# Patient Record
Sex: FEMALE | Race: BLACK OR AFRICAN AMERICAN | Employment: UNEMPLOYED | URBAN - METROPOLITAN AREA
[De-identification: names, ages, dates, MRNs, and addresses within clinical notes are randomized per-mention and may not be internally consistent; named-entity substitution may affect disease eponyms.]

---

## 2017-02-15 ENCOUNTER — TELEPHONE (OUTPATIENT)
Dept: SURGERY | Age: 42
End: 2017-02-15

## 2017-04-04 ENCOUNTER — HOSPITAL ENCOUNTER (OUTPATIENT)
Dept: LAB | Age: 42
Discharge: HOME | End: 2017-04-04
Payer: MEDICARE

## 2017-04-04 ENCOUNTER — OFFICE VISIT (OUTPATIENT)
Dept: SURGERY | Age: 42
End: 2017-04-04

## 2017-04-04 VITALS
SYSTOLIC BLOOD PRESSURE: 122 MMHG | TEMPERATURE: 97.1 F | HEART RATE: 64 BPM | DIASTOLIC BLOOD PRESSURE: 77 MMHG | BODY MASS INDEX: 27.15 KG/M2 | HEIGHT: 67 IN | WEIGHT: 173 LBS

## 2017-04-04 DIAGNOSIS — E55.9 HYPOVITAMINOSIS D: ICD-10-CM

## 2017-04-04 DIAGNOSIS — D50.9 IRON DEFICIENCY ANEMIA, UNSPECIFIED IRON DEFICIENCY ANEMIA TYPE: ICD-10-CM

## 2017-04-04 DIAGNOSIS — K91.2 POST-RESECTION MALABSORPTION: Primary | ICD-10-CM

## 2017-04-04 DIAGNOSIS — E51.9 MANIFESTATIONS OF THIAMINE DEFICIENCY: ICD-10-CM

## 2017-04-04 DIAGNOSIS — K91.2 POST-RESECTION MALABSORPTION: ICD-10-CM

## 2017-04-04 LAB
25(OH)D3 SERPL-MCNC: 35.6 NG/ML (ref 30–100)
ALBUMIN SERPL BCP-MCNC: 3.8 G/DL (ref 3.4–5)
ALBUMIN/GLOB SERPL: 1 {RATIO} (ref 0.8–1.7)
ALP SERPL-CCNC: 49 U/L (ref 45–117)
ALT SERPL-CCNC: 15 U/L (ref 13–56)
ANION GAP BLD CALC-SCNC: 9 MMOL/L (ref 3–18)
AST SERPL W P-5'-P-CCNC: 15 U/L (ref 15–37)
BASOPHILS # BLD AUTO: 0 K/UL (ref 0–0.06)
BASOPHILS # BLD: 0 % (ref 0–2)
BILIRUB SERPL-MCNC: 0.5 MG/DL (ref 0.2–1)
BUN SERPL-MCNC: 21 MG/DL (ref 7–18)
BUN/CREAT SERPL: 38 (ref 12–20)
CALCIUM SERPL-MCNC: 8.4 MG/DL (ref 8.5–10.1)
CHLORIDE SERPL-SCNC: 101 MMOL/L (ref 100–108)
CO2 SERPL-SCNC: 27 MMOL/L (ref 21–32)
CREAT SERPL-MCNC: 0.56 MG/DL (ref 0.6–1.3)
DIFFERENTIAL METHOD BLD: ABNORMAL
EOSINOPHIL # BLD: 0.1 K/UL (ref 0–0.4)
EOSINOPHIL NFR BLD: 2 % (ref 0–5)
ERYTHROCYTE [DISTWIDTH] IN BLOOD BY AUTOMATED COUNT: 15.6 % (ref 11.6–14.5)
FERRITIN SERPL-MCNC: 7 NG/ML (ref 8–388)
FOLATE SERPL-MCNC: 18.7 NG/ML (ref 3.1–17.5)
GLOBULIN SER CALC-MCNC: 3.7 G/DL (ref 2–4)
GLUCOSE SERPL-MCNC: 82 MG/DL (ref 74–99)
HCT VFR BLD AUTO: 31.7 % (ref 35–45)
HGB BLD-MCNC: 10 G/DL (ref 12–16)
IRON SERPL-MCNC: 44 UG/DL (ref 50–175)
LYMPHOCYTES # BLD AUTO: 17 % (ref 21–52)
LYMPHOCYTES # BLD: 1 K/UL (ref 0.9–3.6)
MCH RBC QN AUTO: 28.7 PG (ref 24–34)
MCHC RBC AUTO-ENTMCNC: 31.5 G/DL (ref 31–37)
MCV RBC AUTO: 91.1 FL (ref 74–97)
MONOCYTES # BLD: 0.4 K/UL (ref 0.05–1.2)
MONOCYTES NFR BLD AUTO: 7 % (ref 3–10)
NEUTS SEG # BLD: 4.5 K/UL (ref 1.8–8)
NEUTS SEG NFR BLD AUTO: 74 % (ref 40–73)
PLATELET # BLD AUTO: 379 K/UL (ref 135–420)
PMV BLD AUTO: 11.2 FL (ref 9.2–11.8)
POTASSIUM SERPL-SCNC: 4.1 MMOL/L (ref 3.5–5.5)
PROT SERPL-MCNC: 7.5 G/DL (ref 6.4–8.2)
RBC # BLD AUTO: 3.48 M/UL (ref 4.2–5.3)
SODIUM SERPL-SCNC: 137 MMOL/L (ref 136–145)
VIT B12 SERPL-MCNC: 889 PG/ML (ref 211–911)
WBC # BLD AUTO: 6 K/UL (ref 4.6–13.2)

## 2017-04-04 PROCEDURE — 82728 ASSAY OF FERRITIN: CPT | Performed by: PHYSICIAN ASSISTANT

## 2017-04-04 PROCEDURE — 80053 COMPREHEN METABOLIC PANEL: CPT | Performed by: PHYSICIAN ASSISTANT

## 2017-04-04 PROCEDURE — 82306 VITAMIN D 25 HYDROXY: CPT | Performed by: PHYSICIAN ASSISTANT

## 2017-04-04 PROCEDURE — 85025 COMPLETE CBC W/AUTO DIFF WBC: CPT | Performed by: PHYSICIAN ASSISTANT

## 2017-04-04 PROCEDURE — 82607 VITAMIN B-12: CPT | Performed by: PHYSICIAN ASSISTANT

## 2017-04-04 PROCEDURE — 83540 ASSAY OF IRON: CPT | Performed by: PHYSICIAN ASSISTANT

## 2017-04-04 PROCEDURE — 84425 ASSAY OF VITAMIN B-1: CPT | Performed by: PHYSICIAN ASSISTANT

## 2017-04-04 PROCEDURE — 36415 COLL VENOUS BLD VENIPUNCTURE: CPT | Performed by: PHYSICIAN ASSISTANT

## 2017-04-04 RX ORDER — THIAMINE HYDROCHLORIDE 100 MG/ML
200 INJECTION, SOLUTION INTRAMUSCULAR; INTRAVENOUS ONCE
Qty: 1 VIAL | Refills: 0
Start: 2017-04-04 | End: 2017-04-04

## 2017-04-04 NOTE — LETTER
NOTIFICATION OF RETURN TO WORK / SCHOOL 
 
4/4/2017 12:32 PM 
 
Ms. Abhijeet Rowe [facility] [address] [facility] [address] Marely Ross To Whom It May Concern: 
 
Abhijeet Rowe was under the care of [address] [place] [address] on 4/4/2017 She will be able to return to work 4/5/2017 If there are questions or concerns please have the patient contact our office. Sincerely, [address] [facility]

## 2017-04-04 NOTE — PROGRESS NOTES
Patient presents for a bypass follow up of 7/26/16. She is reporting nausea at this time with lightheadedness x3 days. Body mass index is 27.1 kg/(m^2).

## 2017-04-05 PROBLEM — E51.9 MANIFESTATIONS OF THIAMINE DEFICIENCY: Status: ACTIVE | Noted: 2017-04-05

## 2017-04-05 NOTE — PROGRESS NOTES
Pt seen urgently, walked in to office requesting to be seen for sx of nausea, dizziness x past 3 days. She has upcoming appt with AHMET Lamb MD in 2 days and was in area having labs drawn for this appt. She is s/p sleeve gastrectomy on 7/26/16, last seen by GA on 11/17/16. 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 She has developed progressively worsening nausea and now feels dizzy and \"I'm confused too\". She has been eating salmon and eggs and drinking protein shakes. No emesis. She admits to not being diligent with her vitamin/mineral supplements, and \"misses quite a bit\". She denies EtOH use. Past Medical History:   Diagnosis Date    Anemia     Diabetes ([facility] [address] [facility]     Hypertension     Morbid obesity ([facility] [address]     Psychiatric disorder     Anxiety,panic attacks    Sleep apnea      Current Outpatient Prescriptions on File Prior to Visit   Medication Sig Dispense Refill    dextroamphetamine-amphetamine (ADDERALL) 20 mg tablet Take 20 mg by mouth two (2) times a day.  FLUoxetine (PROZAC) 20 mg capsule Take 20 mg by mouth daily.  calcium citrate 200 mg (950 mg) tablet Take 200 mg by mouth daily.  biotin 2,500 mcg tab Take  by mouth daily.  multivitamin (ONE A DAY) tablet Take 1 Tab by mouth daily.  calcium-cholecalciferol, d3, 600-125 mg-unit tab Take  by mouth daily.  cyanocobalamin 1,000 mcg tablet Take 1,000 mcg by mouth daily.  clonazePAM (KLONOPIN) 1 mg tablet Take 1 mg by mouth as needed. 0    ergocalciferol (ERGOCALCIFEROL) 50,000 unit capsule Take 50,000 Units by mouth every seven (7) days. 0    ferrous sulfate 325 mg (65 mg iron) tablet   0     No current facility-administered medications on file prior to visit.       Weight Metrics 4/4/2017 1/17/2017 11/17/2016 8/19/2016 8/5/2016 7/26/2016 7/21/2016   Weight 173 lb 180 lb 202 lb 234 lb 240 lb - 254 lb   BMI 27.1 kg/m2 28.19 kg/m2 31.64 kg/m2 36.65 kg/m2 37.58 kg/m2 39.77 kg/m2 -     Visit Vitals    /77    Pulse 64    Temp 97.1 °F (36.2 °C)    Ht 5' 7\" (1.702 m)    Wt 78.5 kg (173 lb)    BMI 27.1 kg/m2     Appears unwell  Abd: soft/NT    A/P: 8 months s/p sleeve gastrectomy with supplement noncompliance, nausea, mild cognitive changes with possible early dehydration, thiamine deficiency. Pt agreed to IM inj of B1--administered 100mg IM bilat deltoid without complications. Pt noted improvement in nausea and able to drink fluids in office prior to returning home. Encouraged push fluids x next 2-3 days, start all supplements including B1 100mg poqd, and have labs drawn now (anticipate nl-high B1 levels). Pt to keep scheduled f/u visit with AHMET Martinez MD on 4/6/17.  To call sooner if sx persist/worsen  Nataliya Barger PA-C

## 2017-04-06 LAB — VIT B1 BLD-SCNC: 208.7 NMOL/L (ref 66.5–200)

## 2017-04-18 ENCOUNTER — DOCUMENTATION ONLY (OUTPATIENT)
Dept: BARIATRICS/WEIGHT MGMT | Age: 42
End: 2017-04-18

## 2017-04-19 ENCOUNTER — OFFICE VISIT (OUTPATIENT)
Dept: SURGERY | Age: 42
End: 2017-04-19

## 2017-04-19 VITALS
SYSTOLIC BLOOD PRESSURE: 141 MMHG | RESPIRATION RATE: 20 BRPM | DIASTOLIC BLOOD PRESSURE: 87 MMHG | WEIGHT: 174 LBS | BODY MASS INDEX: 27.31 KG/M2 | HEIGHT: 67 IN | TEMPERATURE: 97.9 F | HEART RATE: 85 BPM

## 2017-04-19 DIAGNOSIS — E55.9 HYPOVITAMINOSIS D: ICD-10-CM

## 2017-04-19 DIAGNOSIS — K91.2 POST-RESECTION MALABSORPTION: Primary | ICD-10-CM

## 2017-04-19 DIAGNOSIS — D50.8 IRON DEFICIENCY ANEMIA SECONDARY TO INADEQUATE DIETARY IRON INTAKE: ICD-10-CM

## 2017-04-19 DIAGNOSIS — E51.9 MANIFESTATIONS OF THIAMINE DEFICIENCY: ICD-10-CM

## 2017-04-19 DIAGNOSIS — E66.01 MORBID OBESITY DUE TO EXCESS CALORIES (HCC): ICD-10-CM

## 2017-04-19 NOTE — PROGRESS NOTES
Subjective:      Lenny Hampton is a 42 y.o. female is now 9 months status post laparoscopic sleeve gastrectomy. Doing well overall. Currently on a full diet without difficulty. Taking in 60oz water daily. Sources of protein include chicken, fish and protein drinks. 30 min of activity 3 days a week, including rowing, sit ups, plank. Patient states that she has been suffering from a state of confusion for about two weeks. You received thiamine injections and that cleared it up for about 4-5 days, but is back to feeling the confusion. She started taking multivitamin but it only has 9mg of thiamine. Bowel movements are constipated. The patient is not having any pain. . The patient is compliant with multivitamins, calcium, Vit D and B12 supplements. Weight Loss Metrics 4/19/2017 4/4/2017 1/17/2017 11/17/2016 11/17/2016 8/19/2016 8/19/2016   Pre op / Initial Wt - - - 259 - 259 -   Today's Wt 174 lb 173 lb 180 lb - 202 lb - 234 lb   BMI 27.25 kg/m2 27.1 kg/m2 28.19 kg/m2 - 31.64 kg/m2 - 36.65 kg/m2   Ideal Body Wt - - - 140 - 140 -   Excess Body Wt - - - 119 - 119 -   Goal Wt - - - 164 - 163.8 -   Wt loss to date - - - 57 - 25 -   % Wt Loss - - - 0.6 - 0.26 -   80% EBW - - - 95.2 - 95.2 -       Body mass index is 27.25 kg/(m^2).     Comorbidities:    Hypertension: improved  Diabetes: resolved  Obstructive Sleep Apnea: improved  Hyperlipidemia: not applicable  Stress Urinary Incontinence: worsened  Gastroesophageal Reflux: not applicable  Weight related arthropathy:not applicable        Past Medical History:   Diagnosis Date    Anemia     Diabetes ([address]     Hypertension     Morbid obesity ([address]     Psychiatric disorder     Anxiety,panic attacks    Sleep apnea        Past Surgical History:   Procedure Laterality Date    HX GASTRECTOMY  7/ 2016    Vertical sleeve Gastrectomy    HX GYN      pelvic laparoscopy       Current Outpatient Prescriptions   Medication Sig Dispense Refill    Omega-3 Fatty Acids (FISH OIL) 500 mg cap Take  by mouth.  dextroamphetamine-amphetamine (ADDERALL) 20 mg tablet Take 20 mg by mouth two (2) times a day.  FLUoxetine (PROZAC) 20 mg capsule Take 20 mg by mouth daily.  calcium citrate 200 mg (950 mg) tablet Take 200 mg by mouth daily.  biotin 2,500 mcg tab Take  by mouth daily.  multivitamin (ONE A DAY) tablet Take 1 Tab by mouth daily.  calcium-cholecalciferol, d3, 600-125 mg-unit tab Take  by mouth daily.  cyanocobalamin 1,000 mcg tablet Take 1,000 mcg by mouth daily.  clonazePAM (KLONOPIN) 1 mg tablet Take 1 mg by mouth as needed. 0    ergocalciferol (ERGOCALCIFEROL) 50,000 unit capsule Take 50,000 Units by mouth every seven (7) days. 0    ferrous sulfate 325 mg (65 mg iron) tablet   0       No Known Allergies      Objective:     Visit Vitals    /87 (BP 1 Location: Left arm, BP Patient Position: At rest)    Pulse 85    Temp 97.9 °F (36.6 °C) (Oral)    Resp 20    Ht 5' 7\" (1.702 m)    Wt 78.9 kg (174 lb)    BMI 27.25 kg/m2       General:  alert, cooperative, no distress, appears stated age   Chest: lungs clear to auscultation, no accessory muscle use   Cor:   Regular rate and rhythm   Abdomen: soft, bowel sounds active, non-tender, no hernias   Incisions:   healing well, no drainage, no erythema, no hernia, no seroma, no swelling, no dehiscence, incision well approximated       Labs: reviewed    Assessment:     Doing well postoperatively but has confusion and constipation    Plan:     Increase activity to the goal of 30 minutes daily, Follow up labs as ordered and Follow up with Registered Dietician  Encouraged to attend support group  Miralax titration was explained to patient  Patient to start taking 100mg of thiamine daily along with the other recommended vitamins  Follow up in 3 months unless the confusion persists, then call us to make an appointment sooner.

## 2017-04-19 NOTE — LETTER
NOTIFICATION OF RETURN TO WORK / SCHOOL 
 
4/19/2017 4:24 PM 
 
Ms. Tod Vaughn [facility] [address] [address] Renee Aldana To Whom It May Concern: 
 
Tod Vaughn was under the care of Frank Van 11 She will be able to return to work on 4/20/2017 If there are questions or concerns please have the patient contact our office.  
 
Sincerely, 
 
 
RYAN Moncada

## 2017-04-19 NOTE — PROGRESS NOTES
Lenny Hampton is a 42 y.o. female who presents today with   Chief Complaint   Patient presents with    Morbid Obesity     Lap Vertical sleeve 7/26/2016

## 2017-06-29 ENCOUNTER — DOCUMENTATION ONLY (OUTPATIENT)
Dept: SURGERY | Age: 42
End: 2017-06-29

## 2017-06-29 NOTE — PROGRESS NOTES
Per [facility] requirements;  E-mail and letter sent for follow up appointment. [facility] [address]      Dear Neftaly Corral,  Congratulations!! It has almost been one year since your bariatric surgery and it is time to schedule a follow up appointment with Dr. Jefferson Mercer. Your health is our main concern. It is important for your health to have follow- up lab work and to see you surgeon at 3 months, 6 months and annually after your weight loss surgery. Additionally, the Department of bariatric Surgery at our hospital is a member of the [employer] [address] Surgical Quality Improvement Program ([facility] NSQIP). As a participant in this program, we gather information on the outcomes of our patients after surgery. Please call the office for a follow up appointment at [phone]. If you have moved out of the area or have changed surgeons please call us and let us know the name of your doctor. Your health and feedback are important to us. We greatly appreciate your response.        Thank you,  [facility] [address] [facility]

## 2018-06-29 ENCOUNTER — DOCUMENTATION ONLY (OUTPATIENT)
Dept: SURGERY | Age: 43
End: 2018-06-29

## 2018-06-29 NOTE — PROGRESS NOTES
Per MBSAQIP requirements:  Letter sent requesting follow up appointment. [facility] [place]  [facility]  [facility]      Dear Patient,  Your health is our main concern. It is important for your health to have follow-up lab work and to see you surgeon at 3 months, 6 months and annually after your weight loss surgery. Additionally, the Department of bariatric Surgery at our hospital is a member of the [employer] [address] Surgical Quality Improvement Program ([facility] NSQIP). As a participant in this program, we gather information on the outcomes of our patients after surgery. Please call the office for a follow up appointment at [phone] with SHEELA Harvey. If you have moved out of the area or have changed surgeons please call us and let us know the name of your doctor. Your health and feedback are important to us. We greatly appreciate your response.        Thank you,  [facility] [address] [facility]

## 2019-06-06 ENCOUNTER — DOCUMENTATION ONLY (OUTPATIENT)
Dept: SURGERY | Age: 44
End: 2019-06-06

## 2019-06-06 NOTE — LETTER
[facility] Dear Patient, Your health is our main concern. It is important for your health to have follow-up lab work and to see you surgeon at 3 months, 6 months and annually after your weight loss surgery. Additionally, the Department of Bariatric Surgery at our hospital is a member of the [employer] [address] Surgical Quality Improvement Program ([facility] NSQIP). As a participant in this program, we gather information on the outcomes of our patients after surgery. Please call the office for a follow up appointment at [phone]. If you have moved out of the area or have changed surgeons please call us and let us know the name of your doctor. Your health and feedback are important to us. We greatly appreciate your response. Thank you, [facility]

## 2019-07-17 ENCOUNTER — TELEPHONE (OUTPATIENT)
Dept: SURGERY | Age: 44
End: 2019-07-17

## 2019-07-17 DIAGNOSIS — K91.2 POST-RESECTION MALABSORPTION: Primary | ICD-10-CM

## 2019-07-17 DIAGNOSIS — E66.01 MORBID OBESITY (HCC): ICD-10-CM

## 2019-08-07 ENCOUNTER — HOSPITAL ENCOUNTER (OUTPATIENT)
Dept: LAB | Age: 44
Discharge: HOME | End: 2019-08-07
Payer: MEDICAID

## 2019-08-07 DIAGNOSIS — K91.2 POST-RESECTION MALABSORPTION: ICD-10-CM

## 2019-08-07 DIAGNOSIS — E66.01 MORBID OBESITY (HCC): ICD-10-CM

## 2019-08-07 LAB
ALBUMIN SERPL-MCNC: 4.1 G/DL (ref 3.4–5)
ANION GAP SERPL CALC-SCNC: 5 MMOL/L (ref 3–18)
BASOPHILS # BLD: 0 K/UL (ref 0–0.1)
BASOPHILS NFR BLD: 0 % (ref 0–2)
BUN SERPL-MCNC: 17 MG/DL (ref 7–18)
BUN/CREAT SERPL: 22 (ref 12–20)
CALCIUM SERPL-MCNC: 9.1 MG/DL (ref 8.5–10.1)
CHLORIDE SERPL-SCNC: 102 MMOL/L (ref 100–111)
CO2 SERPL-SCNC: 30 MMOL/L (ref 21–32)
CREAT SERPL-MCNC: 0.77 MG/DL (ref 0.6–1.3)
DIFFERENTIAL METHOD BLD: ABNORMAL
EOSINOPHIL # BLD: 0 K/UL (ref 0–0.4)
EOSINOPHIL NFR BLD: 0 % (ref 0–5)
ERYTHROCYTE [DISTWIDTH] IN BLOOD BY AUTOMATED COUNT: 20.4 % (ref 11.6–14.5)
GLUCOSE SERPL-MCNC: 90 MG/DL (ref 74–99)
HCT VFR BLD AUTO: 26.6 % (ref 35–45)
HGB BLD-MCNC: 7.1 G/DL (ref 12–16)
LYMPHOCYTES # BLD: 1.4 K/UL (ref 0.9–3.6)
LYMPHOCYTES NFR BLD: 27 % (ref 21–52)
MCH RBC QN AUTO: 19 PG (ref 24–34)
MCHC RBC AUTO-ENTMCNC: 26.7 G/DL (ref 31–37)
MCV RBC AUTO: 71.3 FL (ref 74–97)
MONOCYTES # BLD: 0.2 K/UL (ref 0.05–1.2)
MONOCYTES NFR BLD: 5 % (ref 3–10)
NEUTS SEG # BLD: 3.4 K/UL (ref 1.8–8)
NEUTS SEG NFR BLD: 68 % (ref 40–73)
PLATELET # BLD AUTO: 406 K/UL (ref 135–420)
PMV BLD AUTO: 10.1 FL (ref 9.2–11.8)
POTASSIUM SERPL-SCNC: 4.3 MMOL/L (ref 3.5–5.5)
RBC # BLD AUTO: 3.73 M/UL (ref 4.2–5.3)
SODIUM SERPL-SCNC: 137 MMOL/L (ref 136–145)
WBC # BLD AUTO: 5 K/UL (ref 4.6–13.2)

## 2019-08-07 PROCEDURE — 84425 ASSAY OF VITAMIN B-1: CPT

## 2019-08-07 PROCEDURE — 36415 COLL VENOUS BLD VENIPUNCTURE: CPT

## 2019-08-07 PROCEDURE — 82306 VITAMIN D 25 HYDROXY: CPT

## 2019-08-07 PROCEDURE — 80048 BASIC METABOLIC PNL TOTAL CA: CPT

## 2019-08-07 PROCEDURE — 82607 VITAMIN B-12: CPT

## 2019-08-07 PROCEDURE — 83540 ASSAY OF IRON: CPT

## 2019-08-07 PROCEDURE — 82040 ASSAY OF SERUM ALBUMIN: CPT

## 2019-08-07 PROCEDURE — 82728 ASSAY OF FERRITIN: CPT

## 2019-08-07 PROCEDURE — 85025 COMPLETE CBC W/AUTO DIFF WBC: CPT

## 2019-08-08 LAB
25(OH)D3 SERPL-MCNC: 18.7 NG/ML (ref 30–100)
FERRITIN SERPL-MCNC: 2 NG/ML (ref 8–388)
FOLATE SERPL-MCNC: 19.2 NG/ML (ref 3.1–17.5)
IRON SERPL-MCNC: 22 UG/DL (ref 50–175)
VIT B12 SERPL-MCNC: 435 PG/ML (ref 211–911)

## 2019-08-09 ENCOUNTER — HOSPITAL ENCOUNTER (OUTPATIENT)
Dept: INFUSION THERAPY | Age: 44
Discharge: HOME | End: 2019-08-09
Payer: MEDICAID

## 2019-08-09 ENCOUNTER — OFFICE VISIT (OUTPATIENT)
Dept: SURGERY | Age: 44
End: 2019-08-09

## 2019-08-09 ENCOUNTER — DOCUMENTATION ONLY (OUTPATIENT)
Dept: SURGERY | Age: 44
End: 2019-08-09

## 2019-08-09 ENCOUNTER — TELEPHONE (OUTPATIENT)
Dept: SURGERY | Age: 44
End: 2019-08-09

## 2019-08-09 VITALS
OXYGEN SATURATION: 100 % | HEART RATE: 60 BPM | SYSTOLIC BLOOD PRESSURE: 145 MMHG | RESPIRATION RATE: 17 BRPM | TEMPERATURE: 97.2 F | DIASTOLIC BLOOD PRESSURE: 84 MMHG

## 2019-08-09 VITALS
BODY MASS INDEX: 29.82 KG/M2 | OXYGEN SATURATION: 98 % | WEIGHT: 190 LBS | SYSTOLIC BLOOD PRESSURE: 128 MMHG | HEIGHT: 67 IN | HEART RATE: 81 BPM | DIASTOLIC BLOOD PRESSURE: 81 MMHG | TEMPERATURE: 96.6 F

## 2019-08-09 DIAGNOSIS — Z98.84 HISTORY OF GASTRIC BYPASS: ICD-10-CM

## 2019-08-09 DIAGNOSIS — E66.01 MORBID OBESITY (HCC): Primary | ICD-10-CM

## 2019-08-09 DIAGNOSIS — E55.9 HYPOVITAMINOSIS D: ICD-10-CM

## 2019-08-09 DIAGNOSIS — E53.8 FOLATE DEFICIENCY: ICD-10-CM

## 2019-08-09 DIAGNOSIS — D50.9 IRON DEFICIENCY ANEMIA, UNSPECIFIED IRON DEFICIENCY ANEMIA TYPE: ICD-10-CM

## 2019-08-09 DIAGNOSIS — K91.2 POST-RESECTION MALABSORPTION: ICD-10-CM

## 2019-08-09 DIAGNOSIS — D50.9 IRON DEFICIENCY ANEMIA, UNSPECIFIED IRON DEFICIENCY ANEMIA TYPE: Primary | ICD-10-CM

## 2019-08-09 DIAGNOSIS — Z90.3 HISTORY OF SLEEVE GASTRECTOMY: ICD-10-CM

## 2019-08-09 DIAGNOSIS — E51.9 MANIFESTATIONS OF THIAMINE DEFICIENCY: ICD-10-CM

## 2019-08-09 DIAGNOSIS — K91.2 POSTOPERATIVE MALABSORPTION: ICD-10-CM

## 2019-08-09 LAB — VIT B1 BLD-SCNC: 124.2 NMOL/L (ref 66.5–200)

## 2019-08-09 PROCEDURE — 74011250636 HC RX REV CODE- 250/636: Performed by: PHYSICIAN ASSISTANT

## 2019-08-09 PROCEDURE — 96365 THER/PROPH/DIAG IV INF INIT: CPT

## 2019-08-09 RX ORDER — DEXTROAMPHETAMINE SACCHARATE, AMPHETAMINE ASPARTATE, DEXTROAMPHETAMINE SULFATE AND AMPHETAMINE SULFATE 7.5; 7.5; 7.5; 7.5 MG/1; MG/1; MG/1; MG/1
TABLET ORAL
Refills: 0 | COMMUNITY
Start: 2019-08-06

## 2019-08-09 RX ORDER — SODIUM CHLORIDE 9 MG/ML
250 INJECTION, SOLUTION INTRAVENOUS ONCE
Status: COMPLETED | OUTPATIENT
Start: 2019-08-09 | End: 2019-08-09

## 2019-08-09 RX ADMIN — FERRIC CARBOXYMALTOSE INJECTION 750 MG: 50 INJECTION, SOLUTION INTRAVENOUS at 13:35

## 2019-08-09 RX ADMIN — SODIUM CHLORIDE 250 ML: 900 INJECTION, SOLUTION INTRAVENOUS at 13:35

## 2019-08-09 NOTE — PATIENT INSTRUCTIONS
If you have any questions or concerns about today's appointment, the verbal and/or written instructions you were given for follow up care, please call our office at [phone]. [facility] - [address], [address] 
 
[phone] office [phone] Supplement Resource Guide Protein Supplement (Recommended up to 6 months post-op) ? 60-70 Grams of Protein  (during clear liquid phase) ? 30-50 Grams of Protein  (during soft protein phase and beyond) ? 0-3 g fat per serving/ 0-3 g sugar per serving ? Avoid mixing with milk, fruit, peanut butter, etc.  
(Powder should be made with water or Crystal Light) Calcium Supplement (Lifetime) Look for: Calcium Citrate (1500 mg per day) The body cannot absorb more than 500-600 mg at once and needs to be taken in 3 separate dosages. Recommend: 
? Citracal- 630mg (2 caplets) three times each day ? Upcal D- 3 packages or scoops/day. (Each package taken separately) o wwwTestQuest (powdered calcium) ? Liquid- 3 Tbsp. per day. (Each Tbsp. taken separately) ? Chewable- Must be Calcium citrate 
o www.[employer] 
o www.[employer] Vitamin D (Lifetime)                           Look for: Vitamin D3 (Cholecalciferol) ? 10,000 IU of Vitamin D3 per day ? This is in ADDITION to the Vitamin D in your Calcium and Multivitamin Multi-Vitamin Supplement (Lifetime) Take 2 vitamins separately each day ? Flintstones Complete or a generic chewable complete multivitamin ? Bariatric Advantage Multivitamin Vitamin B1 (Lifetime) ? 100 mg B1 needed per day (if taking Flintstones Complete or a generic complete chewable). ? Additional B1 is NOT needed if taking Bariatric Advantage Multivitamin (Bariatric Advantage has adequate B1 in it). Vitamin B12 (Lifetime) 1000 mcg DAILY of Vitamin B12 
? All Vitamin B12 must be taken sublingually (under your tongue) Iron *Required for menstruating women OR all patients that have a history of low iron* 
? Recommended to take 500 mg of Vitamin C chewable 30 minutes prior to taking the iron to help with absorption ? Avoid taking with calcium supplements. (Calcium inhibits the absorption of iron) ? We recommend going to Bariatric Advantages Website and getting their iron. (www.bariatricadvantage. exozet) (The lemon-lime has 60mg of iron) ? OTC iron is a dosage of 65 mg Local Psychologists/Counselors Nadine Vyas Psy.D. Licensed Clinical Psychologist 
[address] [facility] [address] [address]., [address] [place], [address] Phone: [phone] Fax: [phone] Dr. Gracie Rodriguez [phone] 
[email or web address] (TMG)[facility] Hospitalcounseling. exozet/ 
 
 
Piero Dillon 
www.[facility] CenterunsBluefield Regional Medical CenterGoNabiter. com Latha Ruelas [phone] Dr. Krupa Ruiz 
[facility].exozet.Qwiki/ 
 
 
Jannie Yee 
http://[employer]/ 
 
 
Teresa Search 
[employer].exozet.[employer]. com/dgyww-k-awlvrt-licensed-clinical-social-worker-board-certified-diplomate-in-clinical-social-work.html Willian Lockwood [phone] [facility] [address] [facility], [address] [place], [place].  [phone] [facility] [address] [facility] , Willian Ledbetter, phone [phone] [facility] [address] [facility] , [address] [place] [address]. Phone: [phone] [facility] [address] [facility], [address] [place] [address]. Phone: [phone] [facility] psychiatric group 
[address] [facility]. [address], [place], [place] phone: [phone] [facility] [address] [place] [address]. Phone: [phone] [facility] psych testing, Katie Lizama, PhD 
[address] [place] [address] [address] [facility] [place] [address]. Phone: [phone] [facility] psychological resource [address] [place], [place]. Phone: [phone] Juana Lindo, WAYNE 
[address] [employer], [place], [place]. Phone: [phone] [facility] center [address] [place] [address]. Phone: [phone] The psychotherapy center [address]. Phone: [phone] [facility]  phone: [phone] [facility] psychiatric phone: [phone] [facility] behavioral health phone: [phone]

## 2019-08-09 NOTE — TELEPHONE ENCOUNTER
Notified Brianna Albrecht who stated that the patient needs to come in today as her lab results are abnormal. Requested that I call the patient back and let her know that she really needs to be seen. Patient was called and notified what Brianna Albrecht had stated and patient said \"okay, thank you. I need to call my dad and I will call you all later\". Verbal understanding given to patient. Closing encounter.

## 2019-08-09 NOTE — PROGRESS NOTES
Bariatric Follow Up Note    Isaura Cintron is a 39 y.o. female is now about 3 years status post laparoscopic vertical sleeve gastrectomy lost to follow-up since being seen in February 2017. Struggling a bit. Currently on a regular diet without difficulty. Financial difficulty so pt has not followed up. Taking in 10-20oz fluid,  >30g protein. Admits to not exercising. The patient admits hair loss. Bowel movements are regular. The patient is not having any pain. She  Is not compliant with multivitamins, Protein, calcium, Vit D and B12 supplements. She is not compliant with medications and taking psych meds incorrectly. Has not f/u'd with psych counseling. Pt is tearful and distraught. States barely enough money for food. Concerned re: her health for her children. The patient reports no difficulty eating/drinking. She states she eats and drinks whatever she wants and admits that most of it is \"junk food. \" Living on fast food, soda and fries. The patient denies smoking, etOH use, admits NSAID use and admits to carbonation ingestion. Weight Loss Metrics 8/9/2019 8/9/2019 12/17/2018 4/19/2017 4/4/2017 1/17/2017 11/17/2016   Pre op / Initial Wt 259 - - - - - 259   Today's Wt - 190 lb 189 lb 174 lb 173 lb 180 lb -   BMI - 29.76 kg/m2 29.6 kg/m2 27.25 kg/m2 27.1 kg/m2 28.19 kg/m2 -   Ideal Body Wt 140 - - - - - 140   Excess Body Wt 119 - - - - - 119   Goal Wt 164 - - - - - 164   Wt loss to date 69 - - - - - 57   % Wt Loss 0.72 - - - - - 0.6   80% EBW 95.2 - - - - - 95.2       Body mass index is 29.76 kg/m².       Comorbidities:     Hypertension: improved  Diabetes: resolved  Obstructive Sleep Apnea: improved  Hyperlipidemia: not applicable  Stress Urinary Incontinence: not applicable  Gastroesophageal Reflux: not applicable  Weight related arthropathy:not applicable     Past Medical History:   Diagnosis Date    Anemia     [facility] ([facility]     Hypertension     [facility]     Psychiatric disorder Anxiety,panic attacks    Sleep apnea        Past Surgical History:   Procedure Laterality Date    HX GASTRECTOMY  7/ 2016    Vertical sleeve Gastrectomy    HX GYN      pelvic laparoscopy       Current Outpatient Medications   Medication Sig Dispense Refill    dextroamphetamine-amphetamine (ADDERALL) 30 mg tablet take 1 tablet by mouth twice a day  0    dextroamphetamine-amphetamine (ADDERALL) 20 mg tablet Take 30 mg by mouth two (2) times a day.  FLUoxetine (PROZAC) 20 mg capsule Take 20 mg by mouth daily.  clonazePAM (KLONOPIN) 1 mg tablet Take 1 mg by mouth as needed. 0    ergocalciferol (ERGOCALCIFEROL) 50,000 unit capsule Take 50,000 Units by mouth every seven (7) days. 0     Facility-Administered Medications Ordered in Other Visits   Medication Dose Route Frequency Provider Last Rate Last Dose    0.9% sodium chloride infusion 250 mL  250 mL IntraVENous ONCE Jese Thomas PA-C        ferric carboxymaltose (INJECTAFER) injection solution 750 mg  750 mg IntraVENous ONCE Jese Daniels PA-C           No Known Allergies    ROS:  Review of Systems:  Positives in bold    Constitutional:Unexpected weight gain/loss, night sweats,fatigue/maliase/lethargy, change in sleep, fever, rash, Hair Loss  Eyes:  Visual changes, headaches, eye pain, floaters  ENT:  Rhinorrhea, epistaxis, change in hearing, gingival ulcers, bleeding, sore throat, dysphagia  CV:  Denies chest pain, shortness of breath, difficulty breathing, orthopnea, palpitations, loss of consciousness, claudication  Resp: Cough, wheeze, haemoptysis, sob, exercise intolerence  GI:  Abdominal pain, dysphagia, reflux, bloating, cramping. Obstipation, haematemesis, brbpr, hematochezia,dark tarry stools. Nausea, vomitting, diarrhea, constipation.     Neuro: Paresthesias, numbness, weakness, changes in balance, changes in speech, loss of control of bowel or bladder,   Psych: Changes in depression, anxiety, sleep patterns, change in energy Levels    Remainder of ROS as per HPI. Physicial Exam:  Visit Vitals  /81 (BP Patient Position: Sitting)   Pulse 81   Temp 96.6 °F (35.9 °C) (Oral)   Ht 5' 7\" (1.702 m)   Wt 86.2 kg (190 lb)   SpO2 98%   BMI 29.76 kg/m²         General: AAOX3, pleasant and cooperative to exam. Appropriately groomed. NAD. Non-toxic in appearance. Appears stated age. HENT: NC/AT. PERRLA. Extraocular motions are intact. Sclera anicteric, Conjunctiva Clear. Nares clear. Oropharynx pink, moist without exudate or erythema. Uvula Midline. Neck:  Supple, trachea is midline. No JVD, Lymphadenopathy. No bruits. Chest: Good equal bilateral expansion  Lungs: Clear to auscultation bilaterally without e/o crackles, wheezes or rhales. Heart: RRR, S1 and S2 noted. No c/r/m/g/vpmi. Abdomen: obese, soft and non-tender without distension. Good bowel sounds. No vis/palp masses or pulsations. No organo-splenomegaly. No hernias to my exam. No e/o acute abdomen or peritoneal signs. Previous surgical wounds well-healed. Pelvis: Stable. :  Deferred  Rectal: Deferred  Extremities: Positive pulses in all 4 extremities. Baseline range of motion in all 4 extremities. Strength, sensation and reflexes intact, appropriate and equal in b/l upper and lower extremities. No C/C/E  Neuro: CN II-XII grossly intact without focal deficit. Ambulatory. Skin: Clean, warm and dry. Labs: Significant for an impressive iron deficiency anemia. Folate and ferritin deficiency. Hypovitaminosis D. Reviewed with patient. Provided her with the most recent recommendations for vitamin supplementation. I have referred her to hematology for further evaluation regarding possible iron/blood infusions/transfusion. Assessment/Plan: Pt is currently about 3 years s/p laparoscopic sleeve gastrectomy with a total weight loss of 69 lbs to date, struggling a bit, and is grossly malnourished.     Stressed importance of hydration with SF clear liquids until urine clear. Stop NSAIDs, Stop Carbs. Stop Fast Foods. Stop soda and carbonation. OK to resume bariatric diet, with food content mainly meats/veggies. Encouraged support group attendance, recommended dietician visit with food diary. Advised exercise program of 20-30 minutes daily 5-7 times per week. Recommended utilizing bariatric multivitamins or at least adult chewable multivitamins in lieu of flintstones and/or flintstones gummies. Arranged for first Fe infusion for today to be followed by second and appointment with Dr. Glendy Buckner. Recommended f/u psych asap and provided her with a list of providers. Given paresthesias, pt will be given thiamine injection in office today. The patient I had a omi discussion with regards to the importance of regular follow-up. I reminded her that she signed a contract stating she would do so. I also explained to her that she could suffer many vitamin and nutritional abnormalities that could be permanent and irreversible. Failure to follow-up could also result in death. The patient voices an understanding. Follow up 3 months with labs, sooner as needed. Health Maintenance issues reviewed and except as it relates to bariatrics, deferred to primary care. Greater than 50% of this 45 minute visit was spent couseling the patient about the aforementioned issues.          Jose Manuel Daniels MS, PARedC

## 2019-08-09 NOTE — LETTER
NOTIFICATION OF RETURN TO WORK / SCHOOL 
 
8/9/2019 2:43 PM 
 
Ms. Nemiah Sandifer [facility] [id number] [facility] [id number] Erik Arteaga To Whom It May Concern: 
 
Nemiah Sandifer was under the care of [address] [place] [address]. She will be able to return to work/school on 08/10/2019 with no restrictions. If there are questions or concerns please have the patient contact our office.  
 
Sincerely, 
 
 
Ming Reyez PA-C

## 2019-08-09 NOTE — PROGRESS NOTES
Nemiah Sandifer is a 39 y.o. female (: 1975) presenting to address:    Chief Complaint   Patient presents with    Surgical Follow-up     Bariatric annual 16, sleeve gastrectomy        Medication list and allergies have been reviewed with Nemiah Sandifer and updated as of today's date. I have gone over all Medical, Surgical and Social History with Nemiah Sandifer and updated/added the information accordingly. 1. Have you been to the ER, urgent care clinic since your last visit? Hospitalized since your last visit? No    2. Have you seen or consulted any other health care providers outside of the [address] [place] [address] since your last visit? Include any pap smears or colon screening. No      Visit Vitals  /81 (BP Patient Position: Sitting)   Pulse 81   Temp 96.6 °F (35.9 °C) (Oral)   Ht 5' 7\" (1.702 m)   Wt 86.2 kg (190 lb)   SpO2 98%   BMI 29.76 kg/m²       Current Outpatient Medications:     dextroamphetamine-amphetamine (ADDERALL) 30 mg tablet, take 1 tablet by mouth twice a day, Disp: , Rfl: 0    dextroamphetamine-amphetamine (ADDERALL) 20 mg tablet, Take 30 mg by mouth two (2) times a day.     , Disp: , Rfl:     FLUoxetine (PROZAC) 20 mg capsule, Take 20 mg by mouth daily. , Disp: , Rfl:     clonazePAM (KLONOPIN) 1 mg tablet, Take 1 mg by mouth as needed. , Disp: , Rfl: 0    ferrous sulfate 325 mg (65 mg iron) tablet, , Disp: , Rfl: 0    methocarbamol (ROBAXIN-750) 750 mg tablet, Take 1 Tab by mouth four (4) times daily. , Disp: 15 Tab, Rfl: 0    Omega-3 Fatty Acids (FISH OIL) 500 mg cap, Take  by mouth., Disp: , Rfl:     calcium citrate 200 mg (950 mg) tablet, Take 200 mg by mouth daily. , Disp: , Rfl:     multivitamin (ONE A DAY) tablet, Take 1 Tab by mouth daily. , Disp: , Rfl:     calcium-cholecalciferol, d3, 600-125 mg-unit tab, Take  by mouth daily. , Disp: , Rfl:     cyanocobalamin 1,000 mcg tablet, Take 1,000 mcg by mouth daily. , Disp: , Rfl:    ergocalciferol (ERGOCALCIFEROL) 50,000 unit capsule, Take 50,000 Units by mouth every seven (7) days. , Disp: , Rfl: 0    Pre op weight: 259  EBW: 119  Wt loss to date: 71       Patient reports that due to financial reasons she has been eating non approved food items such as fast food dollar menu items, rice, beans, eggs, and a premier protein mean replacement. She informs me that she has not been compliant with her recommended vitamin regimen and fluid intake as she has not been taking her psychotropic medications as prescribed because she cannot afford them. She does report having parasthenia bilaterally in both her upper and lower extremity that is persistent and increasing in severity. She reports dis-satisfaction with her current psych help and is very tearful during the visit. Patient required a lot of encouragement and emotional support during her visit. Patient did have a positive PHQ and was supplied with a reference for psych help. Patient was assisted in making appointments for today for an iron infusion and also a one-on-one with Hollis Harrison RD for next week for nutrition counseling. After the visit patient was walked to [facility] and scheduled to establish care with MINA Monge DNP-BC for 08/21/19. Patient was supplied with written resources for nutrition and vitamin supplements as well appointment reminders and bio cards for her providers and education reinforcement of education given. After she was able to establish care, patient was walked down to [facility] to receive her iron infusion. Patient was also given 200 mg Thiamine injection to bilateral deltoids, patient tolerated well.

## 2019-08-09 NOTE — TELEPHONE ENCOUNTER
Patient called and canceled her appointment today but requested to speak with a Nurse. Call was transferred to me and patient stated that she just wanted to get her lab results. I notified patient that Hernesto Cosby had made a note stating that he would need to discuss with her in person. Patient stated that she was currently on antibiotics that were given to her by the ED and that she is not feeling well and does not want to leave her house. I notified patient that I would have to speak with Hernesto Millery and let him know that she called and that we would give her a return call. Patient stated \"Well, actually I can just come in for my appointment then\". I notified patient that it looked like they had already took her off the scheduled and that I would just have to add her back on at her original appointment time. Patient became very flustered and stated \"Well I am going to have to go and look at my schedule and then i'll call you back\". I notified patient that I would still notify Hernesto Millery that she had called in for results. Verbal understanding given.

## 2019-08-09 NOTE — PROGRESS NOTES
Patient was scheduled to see me today. She has multiple abnormal lab values which I planned to review with her. Most importantly she is suffering from severe iron deficiency anemia which will likely need an iron transfusion possibly blood transfusion. The patient ultimately canceled her appointment today because she \"does not feel well. \"  The patient does have a history of noncompliance in the past.  I have asked Lulu Werner LPN, to contact the patient to have her reschedule to see me as soon as possible. I plan to review all of her labs in person. Lulu Werner was kind enough to stressed to the patient the importance of following up and that some vitamin and mineral deficiencies can be permanent and irreversible if not treated promptly.   The patient voiced an understanding    Blair Flores, MS, PA-C

## 2019-08-09 NOTE — PROGRESS NOTES
[facility] Progress Note    Date: 2019    Name: Woody Barrett    MRN: [id number]         : 1975    Injectafer Infusion 1 of 2    Ms. Augustin Chavez to [facility], ambulatory, at 1320. Pt was assessed and education was provided. Ms. Jessica Nicholson vitals were reviewed and patient was observed for 5 minutes prior to treatment. Visit Vitals  /84 (BP 1 Location: Left arm, BP Patient Position: Sitting)   Pulse 60   Temp 97.2 °F (36.2 °C)   Resp 17   SpO2 100%   Breastfeeding? No         22g PIV placed in left antecubital x1 attempt. PIV flushed easily and had brisk blood return. Injectafer 750mg in 250mL NS infused over 20 minutes as ordered. Ms. Augustin Chavez tolerated the infusion, and had no complaints. VS remained stable. PIV flushed with NS 10 ml and removed. No bleeding or hematoma noted at site. Guaze and coban applied. Ms. Augustin Chavez was observed for 30 minutes post-infusion. No S/S of adverse reactions at this time. Reviewed discharge instructions with patient, including expected side effects and signs of allergic reaction requiring medical attention (itching/hives/rashes, SOB, chest pain, lip/tongue/facial swelling). Patient given printed copy to take home. Patient verbalized understanding of discharge instructions. Injectafer [employer], signed and scanned into patient chart. Patient armband removed and shredded. Ms. Augustin Chavez was discharged from [place] [address] in stable condition at 1435.  She is to return on 19 at 1100 for Injectafer infusion 2 of 2.    Jeremiah Littlejohn RN  2019  1438

## 2019-08-20 ENCOUNTER — APPOINTMENT (OUTPATIENT)
Dept: INFUSION THERAPY | Age: 44
End: 2019-08-20
Payer: MEDICAID

## 2019-08-21 ENCOUNTER — HOSPITAL ENCOUNTER (OUTPATIENT)
Dept: INFUSION THERAPY | Age: 44
Discharge: HOME | End: 2019-08-21
Payer: MEDICAID

## 2019-08-21 ENCOUNTER — OFFICE VISIT (OUTPATIENT)
Dept: FAMILY MEDICINE CLINIC | Age: 44
End: 2019-08-21

## 2019-08-21 VITALS
SYSTOLIC BLOOD PRESSURE: 110 MMHG | HEART RATE: 62 BPM | OXYGEN SATURATION: 100 % | RESPIRATION RATE: 18 BRPM | TEMPERATURE: 97.2 F | DIASTOLIC BLOOD PRESSURE: 71 MMHG

## 2019-08-21 VITALS
SYSTOLIC BLOOD PRESSURE: 131 MMHG | HEART RATE: 59 BPM | DIASTOLIC BLOOD PRESSURE: 72 MMHG | TEMPERATURE: 97.4 F | RESPIRATION RATE: 18 BRPM | BODY MASS INDEX: 30.23 KG/M2 | HEIGHT: 67 IN | WEIGHT: 192.6 LBS

## 2019-08-21 DIAGNOSIS — D50.8 OTHER IRON DEFICIENCY ANEMIA: Primary | ICD-10-CM

## 2019-08-21 DIAGNOSIS — R21 RASH: ICD-10-CM

## 2019-08-21 DIAGNOSIS — L85.3 DRY SKIN: ICD-10-CM

## 2019-08-21 DIAGNOSIS — E66.01 MORBID OBESITY (HCC): ICD-10-CM

## 2019-08-21 DIAGNOSIS — E11.9 CONTROLLED TYPE 2 DIABETES MELLITUS WITHOUT COMPLICATION, WITHOUT LONG-TERM CURRENT USE OF INSULIN (HCC): ICD-10-CM

## 2019-08-21 PROCEDURE — 74011250636 HC RX REV CODE- 250/636: Performed by: PHYSICIAN ASSISTANT

## 2019-08-21 PROCEDURE — 96365 THER/PROPH/DIAG IV INF INIT: CPT

## 2019-08-21 PROCEDURE — 74011250636 HC RX REV CODE- 250/636: Performed by: INTERNAL MEDICINE

## 2019-08-21 RX ORDER — SODIUM CHLORIDE 9 MG/ML
250 INJECTION, SOLUTION INTRAVENOUS ONCE
Status: COMPLETED | OUTPATIENT
Start: 2019-08-21 | End: 2019-08-21

## 2019-08-21 RX ORDER — BISMUTH SUBSALICYLATE 262 MG
1 TABLET,CHEWABLE ORAL DAILY
COMMUNITY

## 2019-08-21 RX ADMIN — FERRIC CARBOXYMALTOSE INJECTION 750 MG: 50 INJECTION, SOLUTION INTRAVENOUS at 11:31

## 2019-08-21 RX ADMIN — SODIUM CHLORIDE 250 ML: 9 INJECTION, SOLUTION INTRAVENOUS at 11:31

## 2019-08-21 NOTE — PROGRESS NOTES
[facility] Progress Note    Date: 2019    Name: Joseph Vincent    MRN: [id number]         : 1975    Injectafer Infusion 2 of 2    Ms. Kelley Mojica to [facility], ambulatory, at [phone]. Pt was assessed and education was provided. Ms. Cleveland List vitals were reviewed and patient was observed for 5 minutes prior to treatment. Visit Vitals  /71 (BP 1 Location: Left arm, BP Patient Position: Sitting)   Pulse 62   Temp 97.2 °F (36.2 °C)   Resp 18   SpO2 100%         22g PIV placed in left antecubital x1 attempt. PIV flushed easily and had brisk blood return. Injectafer 750mg in 250mL NS infused over 20 minutes as ordered. Ms. Kelley Mojica tolerated the infusion, and had no complaints. VS remained stable. PIV flushed with NS 10 ml and removed. No bleeding or hematoma noted at site. Guaze and coban applied. Ms. Kelley Mojica was observed for 30 minutes post-infusion. No S/S of adverse reactions at this time. Patient armband removed and shredded. Ms. Kelley Mojica was discharged from [place] [address] in stable condition at 1155. She is to follow-up with referring provider.     Bharath Tamez RN  2019  1155

## 2019-08-21 NOTE — PATIENT INSTRUCTIONS
Healthy Lifestyle Choices    The choices you make every day determine whether you are likely to stay healthy or be at risk of developing or worsening chronic diseases like diabetes and high blood pressure. Weight: Your Body mass index is 30.17 kg/m². Body mass index or BMI is only an estimate and does not include factors such as your muscle mass. In general it is healthy to have a BMI between 18.5 and 25. Nutrition: I recommend a diet high in vegetables, low in carbs and sugars, and low in processed foods. The Mediterranean diet is a good guideline for most people even if you are already at a healthy weight. If you would like more guidance, schedule an appointment with me to discuss your nutrition. Hydration: Drink about 8-10 cups of water daily. Avoid soda and other sweetened beverages. Exercise: Aim for at least 30 minutes of physical activity 5 days a week. If you have been sedentary for a while, start with light exercise and gradually increase your activity. Exercise should be something you enjoy and can easily incorporate into your daily routine. Examples: a walk at lunchtime, playing outdoors with your kids, swimming, dancing, yoga, etc.     Sleep: Most people need 7-8 hours of uninterrupted sleep. Talk to me if you are having problems sleeping. Stress: Get at least 20-30 minutes of relaxation daily such as meditation, time with family and friends, pets, exercise, hobbies, etc.     Seatbelt: Always wear your seatbelt! Sunscreen: A little sun exposure (10-20 minutes per day depending on your skin tone) supports healthy vitamin D levels, but avoid excessive sun exposure and sunburn. Dental care: Get a dental check-up and cleaning every 6 months.

## 2019-08-21 NOTE — PROGRESS NOTES
Subjective     Patient ID:  Howard Sequeira is a 39 y.o. ( 1975) female who presents for the following:   Establish Care; Anemia; and Fatigue      HPI   She presents to establish care. She has a history of anxiety, obesity, and gastric sleeve surgery several years ago. She has been without insurance for the last 2 years and has not had follow-up. She recently followed up with her surgeon Dr. Felix Noonan office. Lab work done there did show vitamin deficiencies and iron deficiency anemia. She has restarted supplementation. She is ordering her supplements from [facility] because she did not tolerate other OTC preparations. She is also getting iron infusions, and will be seeing hematology for monitoring. Reports that she is feeling much better already, less fatigue, less mental fogginess. She does report that she gets small skin lesions in her ears and picks at them until they are sores. Also having very dry peeling skin of both feet. She applies lotion once a day. Review of Systems   Constitutional: Positive for fatigue. Negative for appetite change, diaphoresis and unexpected weight change. Eyes: Negative for visual disturbance. Respiratory: Negative for cough, chest tightness and shortness of breath. Cardiovascular: Negative for chest pain, palpitations and leg swelling. Gastrointestinal: Negative for abdominal distention, abdominal pain, blood in stool, constipation, diarrhea, nausea, rectal pain and vomiting. Endocrine: Negative for polydipsia, polyphagia and polyuria. Genitourinary: Negative for decreased urine volume, dysuria and frequency. Musculoskeletal: Negative for joint swelling and myalgias. Skin: Positive for rash. Negative for wound. Neurological: Negative for dizziness, weakness, light-headedness, numbness and headaches. Psychiatric/Behavioral: Negative for dysphoric mood and sleep disturbance. The patient is not nervous/anxious.          Past Medical History, Past Surgery History, Allergies, Social History, and Family History were reviewed and updated.       Past Medical History:   Diagnosis Date    Anemia     Diabetes ([facility]     Hypertension     Morbid obesity ([facility]     Psychiatric disorder     Anxiety,panic attacks    Sleep apnea      Past Surgical History:   Procedure Laterality Date    HX GASTRECTOMY  7/ 2016    Vertical sleeve Gastrectomy    HX GYN      pelvic laparoscopy     Family History   Problem Relation Age of Onset    Hypertension Mother      Social History     Socioeconomic History    Marital status: SINGLE     Spouse name: Not on file    Number of children: Not on file    Years of education: Not on file    Highest education level: Not on file   Occupational History    Occupation: [employer], 3 jobs   Social Needs    Financial resource strain: Not on file    Food insecurity:     Worry: Not on file     Inability: Not on file   LAN-Power needs:     Medical: Not on file     Non-medical: Not on file   Tobacco Use    Smoking status: Former Smoker    Smokeless tobacco: Never Used   Substance and Sexual Activity    Alcohol use: No     Alcohol/week: 0.0 standard drinks    Drug use: No    Sexual activity: Not on file   Lifestyle    Physical activity:     Days per week: Not on file     Minutes per session: Not on file    Stress: Not on file   Relationships    Social connections:     Talks on phone: Not on file     Gets together: Not on file     Attends Yazidi service: Not on file     Active member of club or organization: Not on file     Attends meetings of clubs or organizations: Not on file     Relationship status: Not on file    Intimate partner violence:     Fear of current or ex partner: Not on file     Emotionally abused: Not on file     Physically abused: Not on file     Forced sexual activity: Not on file   Other Topics Concern   [address] Service Not Asked    Blood Transfusions Not Asked    Caffeine Concern Not Asked    Occupational Exposure Not Asked    Hobby Hazards Not Asked    Sleep Concern Not Asked    Stress Concern Not Asked    Weight Concern Not Asked    Special Diet Yes     Comment: low carb, mostly     Back Care Not Asked    Exercise Yes     Comment: active at work, occasional gym    Bike Helmet Not Asked   [address] Not Asked    Self-Exams Not Asked   Social History Narrative    Not on file     No Known Allergies  Current Outpatient Medications on File Prior to Visit   Medication Sig Dispense Refill    multivitamin (ONE A DAY) tablet Take 1 Tab by mouth daily.  thiamine HCl (VITAMIN B-1 PO) Take  by mouth.  ferrous sulfate (IRON PO) Take  by mouth.  dextroamphetamine-amphetamine (ADDERALL) 30 mg tablet take 1 tablet by mouth twice a day  0    FLUoxetine (PROZAC) 20 mg capsule Take 20 mg by mouth daily.  clonazePAM (KLONOPIN) 1 mg tablet Take 1 mg by mouth as needed. 0     No current facility-administered medications on file prior to visit. Objective     Visit Vitals  /72 (BP 1 Location: Right arm, BP Patient Position: At rest)   Pulse (!) 59   Temp 97.4 °F (36.3 °C) (Oral)   Resp 18   Ht 5' 7\" (1.702 m)   Wt 192 lb 9.6 oz (87.4 kg)   LMP 04/21/2019   BMI 30.17 kg/m²     Patient's last menstrual period was 04/21/2019. Physical Exam   Constitutional: She is oriented to person, place, and time. She appears well-developed and well-nourished. No distress. Eyes: Conjunctivae and EOM are normal. Pupils are equal, round, and reactive to light. Neck: Carotid bruit is not present. Cardiovascular: Normal rate, regular rhythm, normal heart sounds, intact distal pulses and normal pulses. No murmur heard. Pulmonary/Chest: Effort normal and breath sounds normal. No respiratory distress. Abdominal: Soft. Normal appearance and bowel sounds are normal. She exhibits no distension, no ascites and no mass. There is no hepatosplenomegaly.  There is no tenderness. Musculoskeletal: She exhibits no edema. Neurological: She is alert and oriented to person, place, and time. Skin: Skin is warm and dry. No rash noted. She is not diaphoretic. Bilateral feet: Dry skin. Bilateral external ears: Erythematous rash. Psychiatric: She has a normal mood and affect. Assessment and Plan     1. Other iron deficiency anemia  Established with hematology as planned. Continue oral iron and iron infusions. 2. Morbid obesity ([facility] [address]  Continue with bariatric program.    3. Controlled type 2 diabetes mellitus without complication, without long-term current use of insulin ([facility] [address] [facility]  Well-controlled. Continue nutrition program and weight loss. 4. Rash  Bilateral pinnae. Avoid picking at skin. Apply antibiotic ointment to the rash until it has resolved. 5. Dry skin  Apply emollient lotion liberally twice a day. Follow-up and Dispositions    · Return in about 6 months (around 2/21/2020). Risks, benefits, and alternatives of the medications and treatment plan prescribed today were discussed, and patient expressed understanding. Printed after visit summary was given to patient and reviewed. All patient questions and concerns were addressed. Plan follow-up as discussed or as needed if any worsening symptoms or change in condition.            Signed electronically by Pepe Parra DNP, MINA-BC

## 2019-10-08 ENCOUNTER — TELEPHONE (OUTPATIENT)
Dept: SURGERY | Age: 44
End: 2019-10-08

## 2019-10-08 NOTE — TELEPHONE ENCOUNTER
Pt called says she thinks her iron levels maybe low again. She was seen on August by Aviva DAVIS and was referred to have an iron infusion,  to a PCP and then Hemoncologist for low iron. She said she was seen by PCP where she established care. She is schedule to see Dr. Tutu Vaz in 2 days on 10/11/19. She reports that she is currently taking iron OTC as directed. She was encouraged to keep her upcoming appointments. She asked to speak to Stefania Bello LPN. All information was passed over to him at this time.

## 2019-10-09 NOTE — TELEPHONE ENCOUNTER
Returned call to patient. She informed me that she was experiencing fatigue which had been present for the last few days. Patient reports being compliant with iron intake, however upon inquiry she has not been taking vitamin C prior to PO ferrous sulfate. She verbalized at that point that she has been experiencing GI upset which she has not been reported to any care team member. Patient was encouraged to contact PCP with any status change and to contact our clinic with any potential bariatric status changes, but also if she is in need of assistance and we can provide clarification of where to seek help. She was inquiring if were going to order another infusion for her. Patient was reminded that was referred to Dr. Maddy Kenny MD whom will be managing her iron deficiency anemia. Patient was aided to  establish care with her PCP and attain an appointment with Dr. Stephanie Doe, which was discussed will be next week. Ms. Elvia Gutierrez was encouraged to contact [facility] to speak with Kayleihg Azul DNP, [facility] and Dr. Stephanie Doe regarding these concerns. Patient verbalized understanding and the call was ended.

## 2019-10-10 ENCOUNTER — OFFICE VISIT (OUTPATIENT)
Dept: ONCOLOGY | Age: 44
End: 2019-10-10

## 2019-10-10 ENCOUNTER — HOSPITAL ENCOUNTER (OUTPATIENT)
Dept: LAB | Age: 44
Discharge: HOME | End: 2019-10-10

## 2019-10-10 VITALS
OXYGEN SATURATION: 100 % | SYSTOLIC BLOOD PRESSURE: 120 MMHG | DIASTOLIC BLOOD PRESSURE: 82 MMHG | HEART RATE: 66 BPM | RESPIRATION RATE: 18 BRPM | TEMPERATURE: 97.3 F | WEIGHT: 187 LBS | HEIGHT: 67 IN | BODY MASS INDEX: 29.35 KG/M2

## 2019-10-10 DIAGNOSIS — E55.9 HYPOVITAMINOSIS D: ICD-10-CM

## 2019-10-10 DIAGNOSIS — E66.01 MORBID OBESITY (HCC): ICD-10-CM

## 2019-10-10 DIAGNOSIS — D50.8 IRON DEFICIENCY ANEMIA SECONDARY TO INADEQUATE DIETARY IRON INTAKE: ICD-10-CM

## 2019-10-10 DIAGNOSIS — N92.4 EXCESSIVE BLEEDING IN PREMENOPAUSAL PERIOD: ICD-10-CM

## 2019-10-10 DIAGNOSIS — D50.8 IRON DEFICIENCY ANEMIA SECONDARY TO INADEQUATE DIETARY IRON INTAKE: Primary | ICD-10-CM

## 2019-10-10 LAB — SENTARA SPECIMEN COL,SENBCF: NORMAL

## 2019-10-10 PROCEDURE — 99001 SPECIMEN HANDLING PT-LAB: CPT

## 2019-10-10 RX ORDER — TRANEXAMIC ACID 650 1/1
1300 TABLET ORAL 3 TIMES DAILY
Qty: 30 TAB | Refills: 1 | Status: SHIPPED | OUTPATIENT
Start: 2019-10-10 | End: 2021-10-27

## 2019-10-10 NOTE — PROGRESS NOTES
[place] [id number], [address]  [facility]  Office Phone: [phone]  Fax: [phone]      Reason for visit: Severe iron deficiency anemia    HPI:   Jodi Daniel is a 39 y.o.  female with past medical history including morbid obesity s/p gastric bypass in 2016, anxiety, type 2 diabetes resolved after surgery, psychiatric disorders, hypertension, obstructive sleep apnea, who I was asked to see in consultation at the request of PCP for evaluation for iron deficiency anemia, completed Injectafer x2 on 8/21/2019, here for first visit with hematology. On 8/07/19, ferritin was 2, WBC 5.0, H&H 7.1/26.6, platelet 957, MCV 71. No family h/o cancer. Mother has heavy menstrual bleeding like her. Patient was seen and examined today. She is awake alert oriented x3. On review of systems she denies any fevers, chills, shortness of breath, nausea vomiting or abdominal pain. No lumps and bumps. No focal neurologic deficit. No headaches or visual changes. Denies any epistaxis, melena or bright red blood per rectum. She is not vegetarian. Does not drink large amount of tea. Does not take any zinc supplement. Reports fatigue but better after iron infusion. Has very heavy menstrual bleeding. Remaining 12 point review of system negative. ECOG performance status 0. Independent with ADLs and IADLs. DX: Iron deficiency anemia    LMP= 10/02/19 and still running with many clots.       Past Medical History:   Diagnosis Date    Anemia     Diabetes ([address]     Hypertension     Morbid obesity ([address]     Psychiatric disorder     Anxiety,panic attacks    Sleep apnea      Past Surgical History:   Procedure Laterality Date    HX GASTRECTOMY  7/ 2016    Vertical sleeve Gastrectomy    HX GYN      pelvic laparoscopy     [unfilled]  Family History   Problem Relation Age of Onset    Hypertension Mother        Current Outpatient Medications   Medication Sig Dispense Refill    multivitamin (ONE A DAY) tablet Take 1 Tab by mouth daily.  thiamine HCl (VITAMIN B-1 PO) Take  by mouth.  ferrous sulfate (IRON PO) Take  by mouth.  dextroamphetamine-amphetamine (ADDERALL) 30 mg tablet take 1 tablet by mouth twice a day  0    FLUoxetine (PROZAC) 20 mg capsule Take 20 mg by mouth daily.  clonazePAM (KLONOPIN) 1 mg tablet Take 1 mg by mouth as needed. 0       No Known Allergies    Review of Systems  On review of systems she denies any fevers, chills, shortness of breath, nausea vomiting or abdominal pain. No lumps and bumps. No focal neurologic deficit. No headaches or visual changes. Denies any epistaxis, melena or bright red blood per rectum. She is not vegetarian. Does not drink large amount of tea. Does not take any zinc supplement. Reports fatigue but better after iron infusion. Remaining 12 point review of system negative. ECOG performance status 0. Independent with ADLs and IADLs. Objective:  Physical Exam:  Visit Vitals  /82 (BP 1 Location: Right arm, BP Patient Position: Sitting)   Pulse 66   Temp 97.3 °F (36.3 °C) (Oral)   Resp 18   Ht 5' 7\" (1.702 m)   Wt 84.8 kg (187 lb)   LMP 10/02/2019 (Exact Date)   SpO2 100%   BMI 29.29 kg/m²         General:  Alert, cooperative, no distress, appears stated age. Head:  Normocephalic, without obvious abnormality, atraumatic. Eyes:  Conjunctivae/corneas clear. PERRL, EOMs intact. Throat: Lips, mucosa, and tongue normal.    Neck: Supple, symmetrical, trachea midline, no adenopathy, thyroid: no enlargement/tenderness/nodules   Back:   Symmetric, no curvature. ROM normal. No CVA tenderness. Lungs:   Clear to auscultation bilaterally. Chest wall:  No tenderness or deformity. Heart:  Regular rate and rhythm, S1, S2 normal, no murmur, click, rub or gallop. Abdomen:   Soft, non-tender. Bowel sounds normal. No masses,  No organomegaly.    Extremities: Extremities normal, atraumatic, no cyanosis or edema. Skin: Skin color, texture, turgor normal. No rashes or lesions. Lymph nodes: Cervical, supraclavicular, and axillary nodes normal.   Neurologic: CNII-XII intact. Diagnostic Imaging     No results found for this or any previous visit. Lab Results  Lab Results   Component Value Date/Time    WBC 5.0 08/07/2019 01:51 PM    HGB 7.1 (L) 08/07/2019 01:51 PM    HCT 26.6 (L) 08/07/2019 01:51 PM    PLATELET 627 27/95/3118 01:51 PM    MCV 71.3 (L) 08/07/2019 01:51 PM       Lab Results   Component Value Date/Time    Sodium 137 08/07/2019 01:51 PM    Potassium 4.3 08/07/2019 01:51 PM    Chloride 102 08/07/2019 01:51 PM    CO2 30 08/07/2019 01:51 PM    Anion gap 5 08/07/2019 01:51 PM    Glucose 90 08/07/2019 01:51 PM    BUN 17 08/07/2019 01:51 PM    Creatinine 0.77 08/07/2019 01:51 PM    BUN/Creatinine ratio 22 (H) 08/07/2019 01:51 PM    GFR est AA >60 08/07/2019 01:51 PM    GFR est non-AA >60 08/07/2019 01:51 PM    Calcium 9.1 08/07/2019 01:51 PM    AST (SGOT) 15 04/04/2017 01:02 PM    Alk. phosphatase 49 04/04/2017 01:02 PM    Protein, total 7.5 04/04/2017 01:02 PM    Albumin 4.1 08/07/2019 01:51 PM    Globulin 3.7 04/04/2017 01:02 PM    A-G Ratio 1.0 04/04/2017 01:02 PM    ALT (SGPT) 15 04/04/2017 01:02 PM   F/U with PCP for health maintenance    Assessment/Plan:  39 y.o. female  1. Iron deficiency anemia secondary to inadequate dietary iron intake  Patient has been having iron deficiency for long time. *CBC with differential, CMP, iron profile and ferritin  *B12 and folate  *Serum copper and zinc  *Celiac panel    2. Morbid obesity ([address]  Refer to bariatric surgery    3. Hypovitaminosis D  Being replaced    4. Heavy menstrual bleeding:   Does not want hysterectomy. Has nausea and GI upset with oral iron and constipation. She takes it at night to help. Has no h/o blood clot.  I will give her Lysteda 1300 mg TID      Return in 1 month

## 2019-10-11 NOTE — TELEPHONE ENCOUNTER
Thank you for the update. Please have her follow up with one of my colleagues as I am out on maternity leave starting this week.

## 2019-10-15 ENCOUNTER — TELEPHONE (OUTPATIENT)
Dept: ONCOLOGY | Age: 44
End: 2019-10-15

## 2019-10-22 NOTE — TELEPHONE ENCOUNTER
Patient reports muscle weakness, fatigue, and \"feeling loopy\". States she is taking oral iron. Advised patient per Dr. Kourtney Jett that iron levels are are not low enough to require IV iron replacement, to continue oral iron and follow up with PCP regarding the above symptoms. Patient verbalized understanding and had no other questions/concerns.

## 2020-06-03 ENCOUNTER — TELEPHONE (OUTPATIENT)
Dept: SURGERY | Age: 45
End: 2020-06-03

## 2020-06-03 DIAGNOSIS — K91.2 POST-RESECTION MALABSORPTION: Primary | ICD-10-CM

## 2020-06-03 DIAGNOSIS — D50.9 IRON DEFICIENCY ANEMIA, UNSPECIFIED IRON DEFICIENCY ANEMIA TYPE: ICD-10-CM

## 2020-06-03 DIAGNOSIS — E51.9 MANIFESTATIONS OF THIAMINE DEFICIENCY: ICD-10-CM

## 2020-06-03 DIAGNOSIS — E53.8 FOLATE DEFICIENCY: ICD-10-CM

## 2020-06-03 NOTE — TELEPHONE ENCOUNTER
Pt called with complaint the she \"doest feel right\". Pt says she is having fatigue issues again. She has been taking her iron 2 times a day as well as her other recommended bariatric vitamins. She asked that a lab order be placed. I asked if she had seen a PCP sine the last time she was here and was encouraged to do so at that time. She says she does not have one and has 3 jobs so she can not go to them. I told her she should have a PCP in place and usually the pat should go through them first and then if there is some issue related to her surgery then she could be referred back to us. She also admits to weight gain and not abiding by the Bariatric diet when asked. She is to follow up with Tony Ruiz for nutritional counseling. There was an annual lab order placed for August but has  because the order was placed over 6 months ago. New order placed today. Pt says she will also contact Dr Hiwot Dunn office for follow up.

## 2020-06-04 ENCOUNTER — DOCUMENTATION ONLY (OUTPATIENT)
Dept: SURGERY | Age: 45
End: 2020-06-04

## 2020-06-04 NOTE — LETTER
Mariella Shukla Surgical Specialist 
C/Avelino Diehl [facility]. [address] [facility], [address] [place] [address] Mariella Jacob [place] [facility] [place] Mariella Shukla Surgical Specialists [facility] Dear Patient, Your health is our main concern. It is important for your health to have follow-up lab work and to see your surgeon at 3 months, 6 months and annually after your weight loss surgery. Additionally, the Department of Bariatric Surgery at our hospital is a member of the [employer] [address] Surgical Quality Improvement Program ([facility] NSQIP). As a participant in this program, we gather information on the outcomes of our patients after surgery. Please call the office for a follow up appointment at [phone]. If you have moved out of the area or have changed surgeons please call us and let us know the name of your doctor. Your health and feedback are important to us. We greatly appreciate your response. Thank you, Mariella Carballo Loss [facility]

## 2020-06-04 NOTE — PROGRESS NOTES
Per [facility] requirements;  E-mail and letter sent for follow up appointment. [facility] Surgical Specialist  Zak Figueroa. [address] [facility] [place]   [facility]  [facility]        Dear Patient,        Your health is our main concern. It is important for your health to have follow-up lab work and to see your surgeon at 3 months, 6 months and annually after your weight loss surgery. Additionally, the Department of Bariatric Surgery at our hospital is a member of the [employer] [address] Surgical Quality Improvement Program ([facility] NSQIP). As a participant in this program, we gather information on the outcomes of our patients after surgery. Please call the office for a follow up appointment at [phone]. If you have moved out of the area or have changed surgeons please call us and let us know the name of your doctor. Your health and feedback are important to us. We greatly appreciate your response.        Thank you,  [facility] [address]

## 2020-08-14 ENCOUNTER — HOSPITAL ENCOUNTER (OUTPATIENT)
Dept: LAB | Age: 45
Discharge: HOME | End: 2020-08-14

## 2020-08-14 LAB — SENTARA SPECIMEN COL,SENBCF: NORMAL

## 2020-08-14 PROCEDURE — 99001 SPECIMEN HANDLING PT-LAB: CPT

## 2020-08-15 LAB
25(OH)D3 SERPL-MCNC: 27 NG/ML (ref 32–100)
A-G RATIO,AGRAT: 1.5 RATIO (ref 1.1–2.6)
ABSOLUTE LYMPHOCYTE COUNT, 10803: 1.3 K/UL (ref 1–4.8)
ALBUMIN SERPL-MCNC: 4.8 G/DL (ref 3.5–5)
ALBUMIN SERPL-MCNC: 4.8 G/DL (ref 3.5–5)
ALP SERPL-CCNC: 39 U/L (ref 25–115)
ALT SERPL-CCNC: 19 U/L (ref 5–40)
ANION GAP SERPL CALC-SCNC: 14 MMOL/L (ref 3–15)
AST SERPL W P-5'-P-CCNC: 22 U/L (ref 10–37)
BASOPHILS # BLD: 0 K/UL (ref 0–0.2)
BASOPHILS NFR BLD: 1 % (ref 0–2)
BILIRUB SERPL-MCNC: 0.3 MG/DL (ref 0.2–1.2)
BUN SERPL-MCNC: 15 MG/DL (ref 6–22)
CALCIUM SERPL-MCNC: 9.7 MG/DL (ref 8.4–10.5)
CHLORIDE SERPL-SCNC: 98 MMOL/L (ref 98–110)
CO2 SERPL-SCNC: 26 MMOL/L (ref 20–32)
CREAT SERPL-MCNC: 0.5 MG/DL (ref 0.5–1.2)
EOSINOPHIL # BLD: 0 K/UL (ref 0–0.5)
EOSINOPHIL NFR BLD: 1 % (ref 0–6)
ERYTHROCYTE [DISTWIDTH] IN BLOOD BY AUTOMATED COUNT: 14.4 % (ref 10–15.5)
FERRITIN SERPL-MCNC: 8 NG/ML (ref 10–291)
GFRAA, 66117: >60
GFRNA, 66118: >60
GLOBULIN,GLOB: 3.2 G/DL (ref 2–4)
GLUCOSE SERPL-MCNC: 92 MG/DL (ref 70–99)
GRANULOCYTES,GRANS: 62 % (ref 40–75)
HCT VFR BLD AUTO: 37 % (ref 35.1–48)
HGB BLD-MCNC: 10.9 G/DL (ref 11.7–16)
IRON,IRN: 49 MCG/DL (ref 30–160)
LYMPHOCYTES, LYMLT: 30 % (ref 20–45)
MCH RBC QN AUTO: 28 PG (ref 26–34)
MCHC RBC AUTO-ENTMCNC: 30 G/DL (ref 31–36)
MCV RBC AUTO: 95 FL (ref 81–99)
MONOCYTES # BLD: 0.3 K/UL (ref 0.1–1)
MONOCYTES NFR BLD: 7 % (ref 3–12)
NEUTROPHILS # BLD AUTO: 2.6 K/UL (ref 1.8–7.7)
PLATELET # BLD AUTO: 327 K/UL (ref 140–440)
PMV BLD AUTO: 11.3 FL (ref 9–13)
POTASSIUM SERPL-SCNC: 4.2 MMOL/L (ref 3.5–5.5)
PROT SERPL-MCNC: 8 G/DL (ref 6.4–8.3)
RBC # BLD AUTO: 3.88 M/UL (ref 3.8–5.2)
SODIUM SERPL-SCNC: 138 MMOL/L (ref 133–145)
WBC # BLD AUTO: 4.2 K/UL (ref 4–11)

## 2020-08-17 NOTE — PROGRESS NOTES
Vitamin D deficiency: Start Vit D 3 5000 units daily PO. Recommend follow up with Hematology already taking Iron BID, has need infusion for BALA in past.     Recommend yearly follow up appt. With myself or NP Betsey Keith.

## 2020-08-20 LAB — VITAMIN B1, WHOLE BLOOD, 66250: 123.6 NMOL/L (ref 66.5–200)

## 2020-08-31 ENCOUNTER — TELEPHONE (OUTPATIENT)
Dept: ONCOLOGY | Age: 45
End: 2020-08-31

## 2020-08-31 NOTE — TELEPHONE ENCOUNTER
Left message on voicemail to contact our office concerning appointment on 8/31, need to reschedule possibly to 9/1/2020

## 2020-10-12 DIAGNOSIS — D50.8 IRON DEFICIENCY ANEMIA SECONDARY TO INADEQUATE DIETARY IRON INTAKE: ICD-10-CM

## 2020-10-12 DIAGNOSIS — D50.8 IRON DEFICIENCY ANEMIA SECONDARY TO INADEQUATE DIETARY IRON INTAKE: Primary | ICD-10-CM

## 2020-10-20 ENCOUNTER — TELEPHONE (OUTPATIENT)
Age: 45
End: 2020-10-20

## 2020-10-20 NOTE — TELEPHONE ENCOUNTER
Left message on voicemail for patient to call office and reschedule appointment after labs are completed

## 2021-04-23 ENCOUNTER — TELEPHONE (OUTPATIENT)
Dept: SURGERY | Age: 46
End: 2021-04-23

## 2021-04-23 DIAGNOSIS — E51.9 MANIFESTATIONS OF THIAMINE DEFICIENCY: ICD-10-CM

## 2021-04-23 DIAGNOSIS — K91.2 POST-RESECTION MALABSORPTION: ICD-10-CM

## 2021-04-23 DIAGNOSIS — D50.9 IRON DEFICIENCY ANEMIA, UNSPECIFIED IRON DEFICIENCY ANEMIA TYPE: ICD-10-CM

## 2021-04-23 DIAGNOSIS — E55.9 HYPOVITAMINOSIS D: ICD-10-CM

## 2021-04-23 DIAGNOSIS — K91.2 POST-RESECTION MALABSORPTION: Primary | ICD-10-CM

## 2021-04-23 DIAGNOSIS — E53.8 FOLATE DEFICIENCY: ICD-10-CM

## 2021-05-03 ENCOUNTER — HOSPITAL ENCOUNTER (OUTPATIENT)
Dept: LAB | Age: 46
Discharge: HOME | End: 2021-05-03

## 2021-05-03 LAB — SENTARA SPECIMEN COL,SENBCF: NORMAL

## 2021-05-03 PROCEDURE — 99001 SPECIMEN HANDLING PT-LAB: CPT

## 2021-05-04 LAB
25(OH)D3 SERPL-MCNC: 29.3 NG/ML (ref 32–100)
ABSOLUTE LYMPHOCYTE COUNT, 10803: 1.2 K/UL (ref 1–4.8)
ALBUMIN SERPL-MCNC: 4.3 G/DL (ref 3.5–5)
ANION GAP SERPL CALC-SCNC: 10 MMOL/L (ref 3–15)
BASOPHILS # BLD: 0 K/UL (ref 0–0.2)
BASOPHILS NFR BLD: 1 % (ref 0–2)
BUN SERPL-MCNC: 16 MG/DL (ref 6–22)
CALCIUM SERPL-MCNC: 9.5 MG/DL (ref 8.4–10.5)
CHLORIDE SERPL-SCNC: 104 MMOL/L (ref 98–110)
CO2 SERPL-SCNC: 30 MMOL/L (ref 20–32)
CREAT SERPL-MCNC: 0.6 MG/DL (ref 0.5–1.2)
EOSINOPHIL # BLD: 0 K/UL (ref 0–0.5)
EOSINOPHIL NFR BLD: 1 % (ref 0–6)
ERYTHROCYTE [DISTWIDTH] IN BLOOD BY AUTOMATED COUNT: 13.5 % (ref 10–15.5)
FERRITIN SERPL-MCNC: 20 NG/ML (ref 10–291)
FOLATE,FOL: >20 NG/ML
GFRAA, 66117: >60
GFRNA, 66118: >60
GLUCOSE SERPL-MCNC: 105 MG/DL (ref 70–99)
GRANULOCYTES,GRANS: 63 % (ref 40–75)
HCT VFR BLD AUTO: 38 % (ref 35.1–48)
HGB BLD-MCNC: 12 G/DL (ref 11.7–16)
IRON,IRN: 81 MCG/DL (ref 30–160)
LYMPHOCYTES, LYMLT: 28 % (ref 20–45)
MCH RBC QN AUTO: 31 PG (ref 26–34)
MCHC RBC AUTO-ENTMCNC: 32 G/DL (ref 31–36)
MCV RBC AUTO: 100 FL (ref 81–99)
MONOCYTES # BLD: 0.3 K/UL (ref 0.1–1)
MONOCYTES NFR BLD: 7 % (ref 3–12)
NEUTROPHILS # BLD AUTO: 2.6 K/UL (ref 1.8–7.7)
PLATELET # BLD AUTO: 296 K/UL (ref 140–440)
PMV BLD AUTO: 11.6 FL (ref 9–13)
POTASSIUM SERPL-SCNC: 4.2 MMOL/L (ref 3.5–5.5)
RBC # BLD AUTO: 3.82 M/UL (ref 3.8–5.2)
SODIUM SERPL-SCNC: 144 MMOL/L (ref 133–145)
VIT B12 SERPL-MCNC: 588 PG/ML (ref 211–911)
WBC # BLD AUTO: 4.1 K/UL (ref 4–11)

## 2021-05-06 ENCOUNTER — TELEPHONE (OUTPATIENT)
Dept: SURGERY | Age: 46
End: 2021-05-06

## 2021-05-06 NOTE — TELEPHONE ENCOUNTER
Contacted patient to advise of recent lab results per Judson Donis NP. Labs reviewed   B12 WNL    B1 not yet resulted    Vit D 3 pts below normal, can inc Vit D 3 to 10,000 units daily PO   Calcium WNL    Iron and Ferritin WNL      Patient states she will purchase Vit D3 OTC.

## 2021-05-08 LAB — VITAMIN B1, WHOLE BLOOD, 66250: 135.9 NMOL/L (ref 66.5–200)

## 2021-05-11 ENCOUNTER — TELEPHONE (OUTPATIENT)
Dept: SURGERY | Age: 46
End: 2021-05-11

## 2021-05-11 NOTE — TELEPHONE ENCOUNTER
LVM for patient to notify she does not qualify for Vit D infusion due to her levels are only a couple of points below normal at 29.3 and to call office if she had any further questions.

## 2021-10-06 ENCOUNTER — TELEPHONE (OUTPATIENT)
Dept: SURGERY | Age: 46
End: 2021-10-06

## 2021-10-06 DIAGNOSIS — K91.2 POST-RESECTION MALABSORPTION: ICD-10-CM

## 2021-10-06 DIAGNOSIS — Z90.3 H/O GASTRIC SLEEVE: Primary | ICD-10-CM

## 2021-10-26 ENCOUNTER — OFFICE VISIT (OUTPATIENT)
Dept: SURGERY | Age: 46
End: 2021-10-26
Payer: MEDICAID

## 2021-10-26 VITALS
BODY MASS INDEX: 34.53 KG/M2 | HEART RATE: 83 BPM | DIASTOLIC BLOOD PRESSURE: 82 MMHG | WEIGHT: 220 LBS | OXYGEN SATURATION: 95 % | SYSTOLIC BLOOD PRESSURE: 129 MMHG | HEIGHT: 67 IN | TEMPERATURE: 97.7 F

## 2021-10-26 DIAGNOSIS — E55.9 VITAMIN D DEFICIENCY: ICD-10-CM

## 2021-10-26 DIAGNOSIS — K91.2 POSTGASTRECTOMY MALABSORPTION: ICD-10-CM

## 2021-10-26 DIAGNOSIS — Z90.3 POSTGASTRECTOMY MALABSORPTION: ICD-10-CM

## 2021-10-26 DIAGNOSIS — R53.83 OTHER FATIGUE: ICD-10-CM

## 2021-10-26 DIAGNOSIS — Z90.3 H/O GASTRIC SLEEVE: Primary | ICD-10-CM

## 2021-10-26 PROCEDURE — 99215 OFFICE O/P EST HI 40 MIN: CPT | Performed by: NURSE PRACTITIONER

## 2021-10-26 PROCEDURE — 96372 THER/PROPH/DIAG INJ SC/IM: CPT | Performed by: NURSE PRACTITIONER

## 2021-10-26 RX ORDER — UREA 10 %
100 LOTION (ML) TOPICAL DAILY
COMMUNITY

## 2021-10-26 RX ORDER — ASPIRIN 325 MG
50000 TABLET, DELAYED RELEASE (ENTERIC COATED) ORAL
Qty: 8 CAPSULE | Refills: 0 | Status: SHIPPED | OUTPATIENT
Start: 2021-10-26 | End: 2022-02-15 | Stop reason: ALTCHOICE

## 2021-10-26 RX ORDER — LANOLIN ALCOHOL/MO/W.PET/CERES
1 CREAM (GRAM) TOPICAL
COMMUNITY

## 2021-10-26 NOTE — PATIENT INSTRUCTIONS
Vitamin D 58677 once weekly for 8 weeks sent to pharmacy  Then take 5000 units daily        [facility] REFERENCE LAB - Epic Page 1 of 1   Client Code:  [id number]         Req/Control #: [id number]       Collection Date:   Bill Insurance Unless Marked - [id number]       Collection Time:   Client Tejinder Strange [ ] - [id number]       Collected By:    Self Pay [ ] - Marcia              Client / Ordering Site Information: Physician Information:   Location: Ira Shirley ([id number])   Address: [address] [place] [address] Zip: [place] [facility]   Phone (Fax): [phone] ([phone])    Ordering: Mark Howard   Title: NP   NPI: [id number]   UPIN: Not on file   Physician ID:           Patient Information:   Name: Giovanni Martell   Gender: Female   YOB: 1975   Age: 54 years   Address: [address] [place] [address] Zip: [place] [address]    SSN: [id number]   Patient ID: [id number]   Phone: [phone]       Alt Control#: [id number]   Alt Patient ID:              CPT CODE TESTS ORDERED (Total: 6) PRIORITY ORDERED EXPECTED EXPIRES TUBE CAP          [ ] BCF - Add Blood Collection Fee        [id number] [id number] CBC WITH AUTOMATED DIFF Routine 10/6/2021 10/6/2021 10/7/2022 Not on File   07863 5107 IRON Routine 10/6/2021 10/6/2021 10/7/2022 Not on File   71063 7130 FERRITIN Routine 10/6/2021 10/6/2021 10/7/2022 Not on File   89252 1737 VITAMIN B1, WHOLE BLOOD Routine 10/6/2021 10/6/2021 10/7/2022 Not on File   [id number] 00558 VITAMIN B12 & FOLATE Routine 10/6/2021 10/6/2021 10/7/2022 Not on File   06125 43526 METABOLIC PANEL, COMPREHENSIVE Routine 10/6/2021 10/6/2021 10/7/2022 Not on File         Comments:         Specimen Source:   ([id number]) VITAMIN B1, WHOLE BLOOD:   Blood            Diagnosis Codes:   Z90.3 K91. 2               Bill Type:      Ins Code:           Responsible Party / Guarantor Information:   Name:     Address: Merged with [facility] Zip:     Phone:     Relation to Pt:   Employer Name:           ABN:      Worker's Comp:   Date of Injury:              Insurance Information:   Primary Insurance: Secondary Insurance:   Ins Co Name: [id number] [facility]   Address 1: [facility] [address]   Address 2: [address] [place] [address]   Policy Number: [id number]   Group #: [id number]    Ins Co Name:     Address 1:     Address 2:     [place], [place] Zip:     Policy Number:     Group #:        Primary Policy Evans / Insured: Secondary Policy Evans / Insured:   Name: Goddard Nims   Address: [address] [place] [address] [address] [facility] [address]   Pt Relation to Subscriber: Self    Name:     Address:          Pt Relation to Subscriber:           Electronically Signed By: Eboni Magana[address]        Goddard Nims   1975 [id number]    Goddard Nims   1975 [id number]    Goddard Nims   1975 [id number]    Goddard Nims   1975 [id number]      Goddard Nims   1975 [id number]    Goddard Nims   1975 [id number]    Goddard Nims   1975 [id number]    Goddard Nims   1975 [id number]

## 2021-10-26 NOTE — PROGRESS NOTES
Bariatric Postoperative Progress Note    CC: Annual Bariatric Follow Up/Fatigue    Shellie Riggs is a 54 y.o. female now 5 years status post laparoscopic sleeve gastrectomy performed on 7/26/16 lost to follow up since being seen in August 2019. Doing well overall. Currently eating protein shake, tuna, steak, eggs, cheese, salads, spinach, peppers, collards, berries, apple, soda, sparkling water, rice and beans, bread occ brownie, donut. Taking in 30 oz water,  100 g protein. 0 min of activity 7 days a week. Bowel movements are constipated. The patient is not having any pain. She is compliant with multivitamins, calcium, Vit D and B12 supplements. Main concern is fatigue. She has a hx of BALA with iron infusions in the past and is concerned that it is low again. She has established a primary care provider and they have ordered labs to assist with this. She would like to discuss labs and determine cause of fatigue. Weight Loss Metrics 10/26/2021 10/26/2021 10/10/2019 8/21/2019 8/9/2019 8/9/2019 12/17/2018   Pre op / Initial Wt 259 - - - 259 - -   Today's Wt - 220 lb 187 lb 192 lb 9.6 oz - 190 lb 189 lb   BMI - 34.46 kg/m2 29.29 kg/m2 30.17 kg/m2 - 29.76 kg/m2 29.6 kg/m2   Ideal Body Wt 140 - - - 140 - -   Excess Body Wt 119 - - - 119 - -   Goal Wt 164 - - - 164 - -   Wt loss to date 39 - - - 69 - -   % Wt Loss 0.41 - - - 0.72 - -   80% EBW 95.2 - - - 95.2 - -       Past Medical History:   Diagnosis Date    Anemia     Diabetes (HCC)     Hypertension     Morbid obesity ([address]     Psychiatric disorder     Anxiety,panic attacks    Sleep apnea        Past Surgical History:   Procedure Laterality Date    HX GASTRECTOMY  7/ 2016    Vertical sleeve Gastrectomy    HX GYN      pelvic laparoscopy       Current Outpatient Medications   Medication Sig Dispense Refill    cyanocobalamin (VITAMIN B12) 100 mcg tablet Take 100 mcg by mouth daily.       calcium citrate-vitamin d3 (CITRACAL+D) 315 mg-5 mcg (200 unit) tab Take 1 Tablet by mouth daily (with breakfast).  cholecalciferol (VITAMIN D3) (50,000 UNITS /1250 MCG) capsule Take 1 Capsule by mouth every seven (7) days. 8 Capsule 0    multivitamin (ONE A DAY) tablet Take 1 Tab by mouth daily.  thiamine HCl (VITAMIN B-1 PO) Take  by mouth.  ferrous sulfate (IRON PO) Take  by mouth.  dextroamphetamine-amphetamine (ADDERALL) 30 mg tablet take 1 tablet by mouth twice a day  0    FLUoxetine (PROZAC) 20 mg capsule Take 20 mg by mouth daily. No Known Allergies    ROS:  Review of Systems   Constitutional: Positive for malaise/fatigue. Weight gain   Cardiovascular: Negative. Gastrointestinal: Negative. Genitourinary: Negative. Skin: Negative. Neurological: Negative. Physicial Exam:  Visit Vitals  /82 (BP 1 Location: Left upper arm, BP Patient Position: Sitting)   Pulse 83   Temp 97.7 °F (36.5 °C)   Ht 5' 7\" (1.702 m)   Wt 99.8 kg (220 lb)   SpO2 95%   BMI 34.46 kg/m²     Physical Exam  Vitals and nursing note reviewed. Constitutional:       Appearance: She is obese. HENT:      Head: Normocephalic and atraumatic. Cardiovascular:      Rate and Rhythm: Normal rate. Pulmonary:      Effort: Pulmonary effort is normal.   Musculoskeletal:         General: Normal range of motion. Neurological:      General: No focal deficit present. Mental Status: She is alert and oriented to person, place, and time. Psychiatric:         Mood and Affect: Mood normal.         Behavior: Behavior is hyperactive. Comments: Frustrated/agitated at times.          Labs:   Recent Results (from the past 672 hour(s))   METABOLIC PANEL, COMPREHENSIVE    Collection Time: 10/20/21 12:20 PM   Result Value Ref Range    Sodium 138 136 - 145 mEq/L    Potassium 4.0 3.5 - 5.1 mEq/L    Chloride 106 98 - 107 mEq/L    CO2 29 21 - 32 mEq/L    Glucose 102 74 - 106 mg/dl    BUN 20 7 - 25 mg/dl    Creatinine 0.7 0.6 - 1.3 mg/dl    GFR est AA >60.0      GFR est non-AA >60      Calcium 9.3 8.5 - 10.1 mg/dl    AST (SGOT) 20 15 - 37 U/L    ALT (SGPT) 21 12 - 78 U/L    Alk.  phosphatase 52 45 - 117 U/L    Bilirubin, total 1.1 (H) 0.2 - 1.0 mg/dl    Protein, total 8.2 6.4 - 8.2 gm/dl    Albumin 3.9 3.4 - 5.0 gm/dl    Anion gap 3 (L) 5 - 15 mmol/L   LIPID PANEL    Collection Time: 10/20/21 12:20 PM   Result Value Ref Range    Cholesterol, total 212 (H) 140 - 199 mg/dl    HDL Cholesterol 78 40 - 96 mg/dl    Triglyceride 31 29 - 150 mg/dl    LDL, calculated 128 0 - 130 mg/dl    CHOL/HDL Ratio 2.7 0.0 - 4.4 Ratio   HEMOGLOBIN A1C W/O EAG    Collection Time: 10/20/21 12:20 PM   Result Value Ref Range    Hemoglobin A1c 6.1 (H) 4.2 - 5.6 %   TSH 3RD GENERATION    Collection Time: 10/20/21 12:20 PM   Result Value Ref Range    TSH 1.120 0.358 - 3.740 uIU/mL   URINALYSIS W/ RFLX MICROSCOPIC    Collection Time: 10/20/21 12:20 PM   Result Value Ref Range    Color YELLOW YELLOW,STRAW      Appearance CLEAR CLEAR      Glucose NEGATIVE NEGATIVE,Negative mg/dl    Bilirubin NEGATIVE NEGATIVE,Negative      Ketone NEGATIVE NEGATIVE,Negative mg/dl    Specific gravity >=1.030 1.005 - 1.030      Blood TRACE (A) NEGATIVE,Negative      pH (UA) 6.0 5.0 - 9.0      Protein NEGATIVE NEGATIVE,Negative mg/dl    Urobilinogen 0.2 0.0 - 1.0 mg/dl    Nitrites NEGATIVE NEGATIVE,Negative      Leukocyte Esterase NEGATIVE NEGATIVE,Negative     CBC W/O DIFF    Collection Time: 10/20/21 12:20 PM   Result Value Ref Range    WBC 3.8 (L) 4.0 - 11.0 1000/mm3    RBC 4.04 3.60 - 5.20 M/uL    HGB 12.4 (L) 13.0 - 17.2 gm/dl    HCT 37.6 37.0 - 50.0 %    MCV 93.1 80.0 - 98.0 fL    MCH 30.7 25.4 - 34.6 pg    MCHC 33.0 30.0 - 36.0 gm/dl    PLATELET 086 522 - 128 1000/mm3    MPV 11.7 (H) 6.0 - 10.0 fL    RDW-SD 44.6 36.4 - 46.3     VITAMIN B1, PLASMA    Collection Time: 10/20/21 12:20 PM   Result Value Ref Range    Vitamin B1, plasma 9 8 - 30 nmol/L   VITAMIN D, 25 HYDROXY    Collection Time: 10/20/21 12:20 PM Result Value Ref Range    Vitamin D, 25-OH, Total 23.7 (L) 30.0 - 100.0 ng/ml   VITAMIN B12    Collection Time: 10/20/21 12:20 PM   Result Value Ref Range    Vitamin B12 886 193 - 986 pg/ml   POC URINE MICROSCOPIC    Collection Time: 10/20/21 12:20 PM   Result Value Ref Range    Epithelial cells, squamous 1-4 /LPF    WBC OCCASIONAL /HPF    RBC OCCASIONAL /HPF    Bacteria OCCASIONAL /HPF       Assessment/Plan:   She is currently 5 years s/p laparoscopic sleeve gastrectomy with a total weight loss of 39 lbs to date, struggling with fatigue and weight regain. Labs from PCP were reviewed and questions answered. Pt was instructed to continue MVI/B1/B12/D supplementation. Back on Track packet given to pt with recommended bariatric supplementation guidelines. Will also prescribe vitamin D due to insufficiency. Iron from May is wnl, CBC from Oct is borderline low, TSH wnl. A1c elevated, she will follow up with PCP. Long discussion regarding lab results and that with these values they are most likely not causing her fatigue. During discussion, pt became very agitated that some results were borderline out of range and that I was not concerned or that they could be causing her fatigue. We discussed taking in whole clinical picture and symptom review as well as labs. Fatigue can also be a symptom of other issues, such as sleep apnea or other disorders or lab deficiencies. With her current diet, and return of prediabetes, fatigue could also be due to blood sugar fluctuations as well as not eating regularly. She verbalized understanding and will try to cut down on high density carbohydrates, eat regularly, increase PA and SF fluid intake. She will also start prescribed vitamin D and thiamine injection given in office. See if fatigue improves with these changes, if not follow up in 2-3 months. Pt agrees with plan.      We have reviewed the components of a successful postoperative course including requirement for a high protein, low carbohydrate diet, 64 oz a day of zero calorie liquids, daily vitamin supplementation, daily exercise (150 mis/week), regular follow-up, and participation in support groups. Refer to registered dietitian for more detailed medical nutrition therapy as needed. The primary encounter diagnosis was H/O gastric sleeve. Diagnoses of Postgastrectomy malabsorption, Vitamin D deficiency, and Other fatigue were also pertinent to this visit. Jolie Stein NP    A total time of 50 minutes was spent face-to-face with the patient during this encounter and over half of that time was spent on counseling.

## 2022-02-15 ENCOUNTER — OFFICE VISIT (OUTPATIENT)
Dept: SURGERY | Age: 47
End: 2022-02-15
Payer: MEDICAID

## 2022-02-15 VITALS
BODY MASS INDEX: 35.94 KG/M2 | DIASTOLIC BLOOD PRESSURE: 99 MMHG | HEIGHT: 67 IN | WEIGHT: 229 LBS | SYSTOLIC BLOOD PRESSURE: 154 MMHG | OXYGEN SATURATION: 100 % | TEMPERATURE: 97.2 F | HEART RATE: 62 BPM

## 2022-02-15 DIAGNOSIS — Z90.3 POSTGASTRECTOMY MALABSORPTION: ICD-10-CM

## 2022-02-15 DIAGNOSIS — Z90.3 H/O GASTRIC SLEEVE: Primary | ICD-10-CM

## 2022-02-15 DIAGNOSIS — R53.82 CHRONIC FATIGUE: ICD-10-CM

## 2022-02-15 DIAGNOSIS — K91.2 POSTGASTRECTOMY MALABSORPTION: ICD-10-CM

## 2022-02-15 DIAGNOSIS — E55.9 VITAMIN D DEFICIENCY: ICD-10-CM

## 2022-02-15 DIAGNOSIS — E66.9 OBESITY (BMI 30-39.9): ICD-10-CM

## 2022-02-15 PROCEDURE — 99214 OFFICE O/P EST MOD 30 MIN: CPT | Performed by: NURSE PRACTITIONER

## 2022-02-15 RX ORDER — THIAMINE HYDROCHLORIDE 100 MG/ML
100 INJECTION, SOLUTION INTRAMUSCULAR; INTRAVENOUS
Status: COMPLETED | OUTPATIENT
Start: 2022-02-15 | End: 2022-02-15

## 2022-02-15 RX ORDER — ASPIRIN 325 MG
50000 TABLET, DELAYED RELEASE (ENTERIC COATED) ORAL
Qty: 8 CAPSULE | Refills: 0 | Status: SHIPPED | OUTPATIENT
Start: 2022-02-15

## 2022-02-15 RX ORDER — PEN NEEDLE, DIABETIC 32 GX 1/6"
1 NEEDLE, DISPOSABLE MISCELLANEOUS DAILY
Qty: 100 PEN NEEDLE | Refills: 1 | Status: SHIPPED | OUTPATIENT
Start: 2022-02-15

## 2022-02-15 RX ORDER — CHOLECALCIFEROL TAB 125 MCG (5000 UNIT) 125 MCG
TAB ORAL DAILY
COMMUNITY
End: 2022-02-15 | Stop reason: ALTCHOICE

## 2022-02-15 RX ADMIN — THIAMINE HYDROCHLORIDE 100 MG: 100 INJECTION, SOLUTION INTRAMUSCULAR; INTRAVENOUS at 14:30

## 2022-02-15 NOTE — Clinical Note
5 years out from sleeve and struggling with weight regain. Needs some help with diet recommendations.

## 2022-02-15 NOTE — PROGRESS NOTES
Bariatric Postoperative Progress Note    CC: Weight Management/Fatigue    Shellie Rizzo is a 51 y.o. female now 5 years status post laparoscopic sleeve gastrectomy performed on 7/26/16. Currently eating tuna, eggs, protein shake, broccoli, brussels, spinach, salad, mushrooms, tomatoes, berries, bread, pasta, soda. Taking in 30 oz water,  Unknown g protein. 0 min of activity 7 days a week. Bowel movements are constipated. The patient is not having any pain. She is compliant with multivitamins, calcium, Vit D and B12 supplements. Only taking 25 mg B1 daily. Requesting thiamine injection as she felt better after last one given. She is requesting something to assist with her chronic reoccurring obesity as she is frustrated with lack of weight loss. Weight Loss Metrics 2/15/2022 2/15/2022 10/26/2021 10/26/2021 10/10/2019 8/21/2019 8/9/2019   Pre op / Initial Wt 259 - 259 - - - 259   Today's Wt - 229 lb - 220 lb 187 lb 192 lb 9.6 oz -   BMI - 35.87 kg/m2 - 34.46 kg/m2 29.29 kg/m2 30.17 kg/m2 -   Ideal Body Wt 140 - 140 - - - 140   Excess Body Wt 119 - 119 - - - 119   Goal Wt 164 - 164 - - - 164   Wt loss to date 30 - 39 - - - 69   % Wt Loss 0.32 - 0.41 - - - 0.72   80% EBW 95.2 - 95.2 - - - 95.2       Past Medical History:   Diagnosis Date    Anemia     Diabetes ([facility]     Hypertension     Morbid obesity ([facility]     Psychiatric disorder     Anxiety,panic attacks    Sleep apnea        Past Surgical History:   Procedure Laterality Date    HX GASTRECTOMY  7/ 2016    Vertical sleeve Gastrectomy    HX GYN      pelvic laparoscopy       Current Outpatient Medications   Medication Sig Dispense Refill    PNV Comb #2-Iron-FA-Omega 3 29-1-400 mg cmpk Take  by mouth.  cholecalciferol (VITAMIN D3) (50,000 UNITS /1250 MCG) capsule Take 1 Capsule by mouth every seven (7) days.  8 Capsule 0    liraglutide, weight loss, (SAXENDA) 3 mg/0.5 mL (18 mg/3 mL) pen Week 1: 0.6 mg SC once daily x 7 days, Week 2: 1.2 mg SC once daily x 7 days, Week 3: 1.8 mg SC once daily x 7 days, Week 4: 2.4 mg SC once daily x 7 days, Week 5: 3.0 mg SC once daily x 7 days 5 Adjustable Dose Pre-filled Pen Syringe 1    pen needle, diabetic (NovoFine Plus) 32 gauge x 1/6\" ndle 1 Dose by Does Not Apply route daily. 100 Pen Needle 1    cyanocobalamin (VITAMIN B12) 100 mcg tablet Take 100 mcg by mouth daily.  calcium citrate-vitamin d3 (CITRACAL+D) 315 mg-5 mcg (200 unit) tab Take 1 Tablet by mouth daily (with breakfast).  thiamine HCl (VITAMIN B-1 PO) Take  by mouth.  ferrous sulfate (IRON PO) Take  by mouth.  dextroamphetamine-amphetamine (ADDERALL) 30 mg tablet take 1 tablet by mouth twice a day  0    FLUoxetine (PROZAC) 20 mg capsule Take 20 mg by mouth daily.  multivitamin (ONE A DAY) tablet Take 1 Tab by mouth daily. (Patient not taking: Reported on 2/15/2022)         No Known Allergies    ROS:  Review of Systems   Constitutional: Positive for malaise/fatigue. Weight gain   Eyes: Negative. Respiratory: Negative. Cardiovascular: Negative. Gastrointestinal: Positive for constipation. Negative for abdominal pain, diarrhea, heartburn, nausea and vomiting. Genitourinary: Negative. Skin: Negative. Neurological: Negative. Physicial Exam:  Visit Vitals  BP (!) 154/99 (BP 1 Location: Right arm, BP Patient Position: Sitting)   Pulse 62   Temp 97.2 °F (36.2 °C)   Ht 5' 7\" (1.702 m)   Wt 103.9 kg (229 lb)   SpO2 100%   BMI 35.87 kg/m²     Physical Exam  Vitals and nursing note reviewed. Constitutional:       Appearance: She is obese. HENT:      Head: Normocephalic and atraumatic. Cardiovascular:      Rate and Rhythm: Normal rate. Pulmonary:      Effort: Pulmonary effort is normal.   Musculoskeletal:         General: Normal range of motion. Neurological:      General: No focal deficit present. Mental Status: She is alert and oriented to person, place, and time.    Psychiatric: Mood and Affect: Mood normal.         Behavior: Behavior normal.       Labs:   No results found for this or any previous visit (from the past 672 hour(s)). Assessment/Plan:   He/She is currently 5 years s/p laparoscopic gastric bypass surgery with a total weight loss of 30 lbs to date, struggling with fatigue and weight regain. Discussed taking 100 mg B1 daily, thiamine injection given in office. Discussed weight loss medications to assist with her chronic reoccurring obesity. Orlistat: may reduce absorption of fat soluble vitamins, leading to a deficiency state. Predisposing factors include a pre-excitsing deficiency of fat soluable vitamins or a malabsorption syndrome. For this reason this contraindicated in this patient  Phentermine (Adipex-P): contraindicated with use of Adderall  Benzphetamine (Didrex): see phentermine  Diethylpropion (Tenuate): see phentermine  Bupropion-naltrexone (Contrave): contraindicated since she is currently taking Prozac  Phentermine-topiramate (Qysmia): see phentermine   Saxenda/Wegovy: Recommended for pt's chronic reoccurring obesity  Other (please specify) s/p gastric sleeve with weight regain, work with RD, low carb/high protein diet and PA    Saxenda: SubQ: Initial: 0.6 mg once daily for one week; increase by 0.6 mg daily at weekly intervals to a target dose of 3 mg once daily. Adverse reactions, contridations, warnings, and precautions according to the rx info reviewed with pt. She will notify me of any SE/SS. We have reviewed the components of a successful postoperative course including requirement for a high protein, low carbohydrate diet, 64 oz a day of zero calorie liquids, daily vitamin supplementation, daily exercise (150 mis/week), regular follow-up, and participation in support groups. Refer to registered dietitian for more detailed medical nutrition therapy as needed. The primary encounter diagnosis was H/O gastric sleeve.  Diagnoses of Postgastrectomy malabsorption, Vitamin D deficiency, Obesity (BMI 30-39.9), BMI 35.0-35.9,adult, and Chronic fatigue were also pertinent to this visit. Follow-up and Dispositions    · Return in about 4 weeks (around 3/15/2022). sooner as needed.   Joan Luke NP

## 2022-03-15 ENCOUNTER — OFFICE VISIT (OUTPATIENT)
Dept: SURGERY | Age: 47
End: 2022-03-15
Payer: MEDICAID

## 2022-03-15 VITALS
TEMPERATURE: 97.5 F | BODY MASS INDEX: 35.31 KG/M2 | WEIGHT: 225 LBS | SYSTOLIC BLOOD PRESSURE: 131 MMHG | DIASTOLIC BLOOD PRESSURE: 81 MMHG | HEIGHT: 67 IN | HEART RATE: 74 BPM | OXYGEN SATURATION: 100 %

## 2022-03-15 DIAGNOSIS — E55.9 VITAMIN D DEFICIENCY: ICD-10-CM

## 2022-03-15 DIAGNOSIS — K91.2 POSTGASTRECTOMY MALABSORPTION: ICD-10-CM

## 2022-03-15 DIAGNOSIS — R53.82 CHRONIC FATIGUE: ICD-10-CM

## 2022-03-15 DIAGNOSIS — E66.9 OBESITY (BMI 30-39.9): ICD-10-CM

## 2022-03-15 DIAGNOSIS — Z90.3 POSTGASTRECTOMY MALABSORPTION: ICD-10-CM

## 2022-03-15 DIAGNOSIS — Z90.3 H/O GASTRIC SLEEVE: Primary | ICD-10-CM

## 2022-03-15 DIAGNOSIS — Z90.3 H/O GASTRIC SLEEVE: ICD-10-CM

## 2022-03-15 PROCEDURE — 99213 OFFICE O/P EST LOW 20 MIN: CPT | Performed by: NURSE PRACTITIONER

## 2022-03-15 RX ORDER — ERGOCALCIFEROL 1.25 MG/1
50000 CAPSULE ORAL
Qty: 12 CAPSULE | Refills: 1 | Status: SHIPPED | OUTPATIENT
Start: 2022-03-15

## 2022-03-15 NOTE — PROGRESS NOTES
Bariatric Postoperative Progress Note    CC: Weight Management    Yfn Norton is a 51 y.o. female now 5 years status post laparoscopic sleeve gastrectomy performed on 7/26/16.  -4 lbs  Doing well overall. Currently eating tuna, chicken, turkey, cheese,  spinach, salad, string beans, brussels, berries, sweet tea. Taking in 64 oz water,  Unknown g protein. 0 min of activity 7 days a week. Bowel movements are regular. The patient is not having any pain. She is compliant with multivitamins, calcium, Vit D and B12 supplements. Unable to get Saxenda from pharmacy. Still experiencing fatigue, reported she felt better after thiamine injection in office last visit.      Weight Loss Metrics 3/15/2022 3/15/2022 2/15/2022 2/15/2022 10/26/2021 10/26/2021 10/10/2019   Pre op / Initial Wt 259 - 259 - 259 - -   Today's Wt - 225 lb - 229 lb - 220 lb 187 lb   BMI - 35.24 kg/m2 - 35.87 kg/m2 - 34.46 kg/m2 29.29 kg/m2   Ideal Body Wt 140 - 140 - 140 - -   Excess Body Wt 119 - 119 - 119 - -   Goal Wt 164 - 164 - 164 - -   Wt loss to date 34 - 30 - 39 - -   % Wt Loss 0.36 - 0.32 - 0.41 - -   80% EBW 95.2 - 95.2 - 95.2 - -       Past Medical History:   Diagnosis Date    Anemia     Diabetes (HCC)     Hypertension     [facility] ([facility]     Psychiatric disorder     Anxiety,panic attacks    Sleep apnea        Past Surgical History:   Procedure Laterality Date    HX GASTRECTOMY  7/ 2016    Vertical sleeve Gastrectomy    HX GYN      pelvic laparoscopy       Current Outpatient Medications   Medication Sig Dispense Refill    liraglutide, weight loss, (SAXENDA) 3 mg/0.5 mL (18 mg/3 mL) pen Week 1: 0.6 mg SC once daily x 7 days, Week 2: 1.2 mg SC once daily x 7 days, Week 3: 1.8 mg SC once daily x 7 days, Week 4: 2.4 mg SC once daily x 7 days, Week 5: 3.0 mg SC once daily x 7 days 5 Adjustable Dose Pre-filled Pen Syringe 1    ergocalciferol (ERGOCALCIFEROL) 1,250 mcg (50,000 unit) capsule Take 1 Capsule by mouth every seven (7) days. 12 Capsule 1    cholecalciferol (VITAMIN D3) (50,000 UNITS /1250 MCG) capsule Take 1 Capsule by mouth every seven (7) days. 8 Capsule 0    calcium citrate-vitamin d3 (CITRACAL+D) 315 mg-5 mcg (200 unit) tab Take 1 Tablet by mouth daily (with breakfast).  multivitamin (ONE A DAY) tablet Take 1 Tablet by mouth daily.  thiamine HCl (VITAMIN B-1 PO) Take  by mouth.  ferrous sulfate (IRON PO) Take  by mouth.  dextroamphetamine-amphetamine (ADDERALL) 30 mg tablet take 1 tablet by mouth twice a day  0    FLUoxetine (PROZAC) 20 mg capsule Take 20 mg by mouth daily.  PNV Comb #2-Iron-FA-Omega 3 29-1-400 mg cmpk Take  by mouth. (Patient not taking: Reported on 3/15/2022)      pen needle, diabetic (NovoFine Plus) 32 gauge x 1/6\" ndle 1 Dose by Does Not Apply route daily. (Patient not taking: Reported on 3/15/2022) 100 Pen Needle 1    cyanocobalamin (VITAMIN B12) 100 mcg tablet Take 100 mcg by mouth daily. (Patient not taking: Reported on 3/15/2022)         No Known Allergies    ROS:  Review of Systems   Constitutional: Positive for malaise/fatigue and weight loss. Respiratory: Negative. Cardiovascular: Negative. Gastrointestinal: Negative. Neurological: Negative. Physicial Exam:  Visit Vitals  /81 (BP 1 Location: Right arm, BP Patient Position: Sitting)   Pulse 74   Temp 97.5 °F (36.4 °C)   Ht 5' 7\" (1.702 m)   Wt 102.1 kg (225 lb)   SpO2 100%   BMI 35.24 kg/m²     Physical Exam  Vitals and nursing note reviewed. Constitutional:       Appearance: She is obese. HENT:      Head: Normocephalic and atraumatic. Cardiovascular:      Rate and Rhythm: Normal rate. Pulmonary:      Effort: Pulmonary effort is normal.   Musculoskeletal:         General: Normal range of motion. Neurological:      General: No focal deficit present. Mental Status: She is alert and oriented to person, place, and time.    Psychiatric:         Mood and Affect: Mood normal. Behavior: Behavior normal.         Labs:   No results found for this or any previous visit (from the past 672 hour(s)). Assessment/Plan:   Pharmacy reports they never received Saxenda prescription. Resent Saxenda and prior-auth completed which was approved. SubQ: Initial: 0.6 mg once daily for one week; increase by 0.6 mg daily at weekly intervals to a target dose of 3 mg once daily. Discussed SE and dosing schedule at last visit, pt will let me know if she experiences any SE. She tried D2 she had available at home and reports it made her feel better and is requesting prescription, will send in weekly high dose D2. Will repeat labs to see if they have improved in past 6 months with changes in bariatric vitamin supplementation. We have reviewed the components of a successful postoperative course including requirement for a high protein, low carbohydrate diet, 64 oz a day of zero calorie liquids, daily vitamin supplementation, daily exercise (150 mis/week), regular follow-up, and participation in support groups. Refer to registered dietitian for more detailed medical nutrition therapy as needed. The primary encounter diagnosis was H/O gastric sleeve. Diagnoses of Postgastrectomy malabsorption, Vitamin D deficiency, Obesity (BMI 30-39.9), BMI 35.0-35.9,adult, and Chronic fatigue were also pertinent to this visit. Follow-up and Dispositions    · Return in about 4 weeks (around 4/12/2022). with labs, sooner as needed.   Lima Gutierrez NP

## 2022-03-16 ENCOUNTER — TELEPHONE (OUTPATIENT)
Dept: SURGERY | Age: 47
End: 2022-03-16

## 2022-03-16 NOTE — TELEPHONE ENCOUNTER
Attempted to contact patient regarding prior auth approval for Saxenda. Left voicemail for patient to return call to office.

## 2022-03-18 ENCOUNTER — TELEPHONE (OUTPATIENT)
Dept: SURGERY | Age: 47
End: 2022-03-18

## 2022-03-18 PROBLEM — E51.9 MANIFESTATIONS OF THIAMINE DEFICIENCY: Status: ACTIVE | Noted: 2017-04-05

## 2022-03-18 NOTE — TELEPHONE ENCOUNTER
Patient call regarding her Saxenda. Patient stated that we are allowed to left a detailed message on her phone. Per Paula Padilla the medication was approved and the patient should be able to get the medication. Patient is aware and stated understanding.

## 2022-03-19 PROBLEM — E55.9 HYPOVITAMINOSIS D: Status: ACTIVE | Noted: 2017-04-04

## 2022-03-20 PROBLEM — D50.9 IRON DEFICIENCY ANEMIA: Status: ACTIVE | Noted: 2017-04-04

## 2022-07-05 ENCOUNTER — HOSPITAL ENCOUNTER (OUTPATIENT)
Dept: LAB | Age: 47
Discharge: HOME | End: 2022-07-05

## 2022-07-05 ENCOUNTER — OFFICE VISIT (OUTPATIENT)
Dept: SURGERY | Age: 47
End: 2022-07-05
Payer: MEDICAID

## 2022-07-05 VITALS
HEIGHT: 67 IN | TEMPERATURE: 97 F | SYSTOLIC BLOOD PRESSURE: 160 MMHG | HEART RATE: 60 BPM | WEIGHT: 219 LBS | BODY MASS INDEX: 34.37 KG/M2 | DIASTOLIC BLOOD PRESSURE: 103 MMHG

## 2022-07-05 DIAGNOSIS — Z90.3 H/O GASTRIC SLEEVE: Primary | ICD-10-CM

## 2022-07-05 DIAGNOSIS — E66.9 OBESITY (BMI 30-39.9): ICD-10-CM

## 2022-07-05 DIAGNOSIS — Z90.3 POSTGASTRECTOMY MALABSORPTION: ICD-10-CM

## 2022-07-05 DIAGNOSIS — Z79.899 FOLLOW-UP ENCOUNTER INVOLVING MEDICATION: ICD-10-CM

## 2022-07-05 DIAGNOSIS — K91.2 POSTGASTRECTOMY MALABSORPTION: ICD-10-CM

## 2022-07-05 DIAGNOSIS — Z71.3 ENCOUNTER FOR WEIGHT LOSS COUNSELING: ICD-10-CM

## 2022-07-05 LAB — SENTARA SPECIMEN COL,SENBCF: NORMAL

## 2022-07-05 PROCEDURE — 99213 OFFICE O/P EST LOW 20 MIN: CPT | Performed by: NURSE PRACTITIONER

## 2022-07-05 PROCEDURE — 99001 SPECIMEN HANDLING PT-LAB: CPT

## 2022-07-05 RX ORDER — CLONAZEPAM 0.5 MG/1
TABLET ORAL
COMMUNITY

## 2022-07-05 NOTE — PROGRESS NOTES
Bariatric Postoperative Progress Note    CC: Weight Management/Medication Follow Up    Ramo Lorenz is a 51 y.o. female now 6 years status post laparoscopic sleeve gastrectomy performed on 7/26/16.  -6 lbs  Doing well overall. Currently eating eggs, deli meat, chicken, cheese, nuts, spinach, tomatoes, cucumbers, cake, cookies, soda, bread. Taking in 32-50 oz water,  Unknown g protein. 0 min of activity 7 days a week. Bowel movements are regular. She is compliant with multivitamins, calcium, Vit D and B12 supplements. Doing well on Saxenda, will experience some nausea when increasing dose, but will go back down to previous dose for a couple of days and try again. Pharmacy had to order refill, so was out for more than 2 days and restarted titration schedule again. Currently at 1.2 mg dose. Reports that medication is making her feel acevedo longer and more satisfied with what she is eating. One pen she received did not work. 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 Is planning to start taking healthier snacks to work as well as meal prepping.     Weight Loss Metrics 7/5/2022 7/5/2022 3/15/2022 3/15/2022 2/15/2022 2/15/2022 10/26/2021   Pre op / Initial Wt 259 - 259 - 259 - 259   Today's Wt - 219 lb - 225 lb - 229 lb -   BMI - 34.3 kg/m2 - 35.24 kg/m2 - 35.87 kg/m2 -   Ideal Body Wt 140 - 140 - 140 - 140   Excess Body Wt 119 - 119 - 119 - 119   Goal Wt 164 - 164 - 164 - 164   Wt loss to date 40 - 34 - 30 - 39   % Wt Loss 0.42 - 0.36 - 0.32 - 0.41   80% EBW 95.2 - 95.2 - 95.2 - 95.2       Past Medical History:   Diagnosis Date    Anemia     [facility] ([facility] [address]     Hypertension     Morbid obesity ([facility]     Psychiatric disorder     Anxiety,panic attacks    Sleep apnea        Past Surgical History:   Procedure Laterality Date    HX GASTRECTOMY  7/ 2016    Vertical sleeve Gastrectomy    HX GYN pelvic laparoscopy       Current Outpatient Medications   Medication Sig Dispense Refill    clonazePAM (KlonoPIN) 0.5 mg tablet Take  by mouth nightly as needed for Anxiety.  liraglutide, weight loss, (SAXENDA) 3 mg/0.5 mL (18 mg/3 mL) pen Week 1: 0.6 mg SC once daily x 7 days, Week 2: 1.2 mg SC once daily x 7 days, Week 3: 1.8 mg SC once daily x 7 days, Week 4: 2.4 mg SC once daily x 7 days, Week 5 and beyond: 3.0 mg SC once daily x 7 days 5 Adjustable Dose Pre-filled Pen Syringe 2    cholecalciferol (VITAMIN D3) (50,000 UNITS /1250 MCG) capsule Take 1 Capsule by mouth every seven (7) days. 8 Capsule 0    pen needle, diabetic (NovoFine Plus) 32 gauge x 1/6\" ndle 1 Dose by Does Not Apply route daily. 100 Pen Needle 1    cyanocobalamin (VITAMIN B12) 100 mcg tablet Take 100 mcg by mouth daily.  calcium citrate-vitamin d3 (CITRACAL+D) 315 mg-5 mcg (200 unit) tab Take 1 Tablet by mouth daily (with breakfast).  multivitamin (ONE A DAY) tablet Take 1 Tablet by mouth daily.  thiamine HCl (VITAMIN B-1 PO) Take  by mouth.  ferrous sulfate (IRON PO) Take  by mouth.  dextroamphetamine-amphetamine (ADDERALL) 30 mg tablet take 1 tablet by mouth twice a day  0    FLUoxetine (PROZAC) 20 mg capsule Take 20 mg by mouth daily.  ergocalciferol (ERGOCALCIFEROL) 1,250 mcg (50,000 unit) capsule Take 1 Capsule by mouth every seven (7) days. 12 Capsule 1    PNV Comb #2-Iron-FA-Omega 3 29-1-400 mg cmpk Take  by mouth. (Patient not taking: Reported on 3/15/2022)         No Known Allergies    ROS:  Review of Systems   Constitutional: Positive for malaise/fatigue and weight loss. Respiratory: Negative. Cardiovascular: Negative for chest pain and palpitations. Gastrointestinal: Positive for nausea. Negative for abdominal pain, constipation, diarrhea, heartburn and vomiting. Neurological: Negative.           Physicial Exam:  Visit Vitals  BP (!) 160/103 (BP 1 Location: Left upper arm, BP Patient Position: Sitting)   Pulse 60   Temp 97 °F (36.1 °C)   Ht 5' 7\" (1.702 m)   Wt 99.3 kg (219 lb)   BMI 34.30 kg/m²     Physical Exam  Vitals and nursing note reviewed. Constitutional:       Appearance: She is obese. HENT:      Head: Normocephalic and atraumatic. Cardiovascular:      Rate and Rhythm: Normal rate. Pulmonary:      Effort: Pulmonary effort is normal.   Musculoskeletal:         General: Normal range of motion. Neurological:      General: No focal deficit present. Mental Status: She is alert and oriented to person, place, and time. Psychiatric:         Mood and Affect: Mood normal.         Behavior: Behavior normal.         Labs:   Recent Results (from the past 672 hour(s))   [facility] [address] [facility]. Collection Time: 07/05/22 10:37 AM   Result Value Ref Range    [facility] Specimens collected/sent to [facility]         Assessment/Plan:   -Will refill Saxenda, titrate to 3 mg daily as tolerated. -Follow up with  regarding non-working pen. -She will get labs completed today, will call when resulted. -Continue with high protein/low carb diet. Work on eating healthier at [employer] and [employer].   -Have water bottle at work station and take sips throughout the day. -Elevated BP in office, pt asymptomatic. She reports she just took her Adderall and had some espresso. Discussed to follow up with PCP. We have reviewed the components of a successful postoperative course including requirement for a high protein, low carbohydrate diet, 64 oz a day of zero calorie liquids, daily vitamin supplementation, daily exercise (150 mis/week), regular follow-up, and participation in support groups. Refer to registered dietitian for more detailed medical nutrition therapy as needed. The primary encounter diagnosis was H/O gastric sleeve.  Diagnoses of Postgastrectomy malabsorption, Obesity (BMI 30-39.9), BMI 34.0-34.9,adult, Encounter for weight loss counseling, and Follow-up encounter involving medication were also pertinent to this visit. Follow-up and Dispositions    · Return in about 3 months (around 10/5/2022). sooner as needed.   Ranjan Moore NP

## 2022-07-05 NOTE — PATIENT INSTRUCTIONS
[facility] REFERENCE LAB - Epic Page 1 of 1   Client Code:  [id number]         Req/Control #: [id number]       Collection Date:   Bill Insurance Unless Marked - [id number]       Collection Time:   Client Grecia Gomez [ ] - [id number]       Collected By:    Self Pay [ ] - [id number]            Office/Provider Information    Account Name:  [facility]                              Address:  [address] [place] [address]  Phone: [phone]  Fax: [phone] Provider:           Tiffanie Roth NP     Provider NPI:   [id number]         Patient Information:   Name: Patricia Doe   Gender: Female   SSN: [id number]   Patient ID: [id number]    YOB: 1975 (51 years)   Phone: [phone]   Address: [address] [place], [place] [id number]            CPT CODE TESTS ORDERED (Total: 6) PRIORITY ORDERED EXPECTED EXPIRES TUBE CAP          [ ] BCF - Add Blood Collection Fee        [id number] FERRITIN Routine 3/16/2022     Not on File   [id number] IRON Routine 3/16/2022     Not on File   [id number] METABOLIC PANEL, COMPREHENSIVE Routine 3/16/2022     Not on File   [id number] VITAMIN B1, WHOLE BLOOD Routine 3/16/2022     Not on File   [id number] VITAMIN B12 & FOLATE Routine 3/16/2022     Not on File   [id number] VITAMIN D, 25 HYDROXY Routine 3/16/2022     Not on File         Comments:         Specimen Source:   ([id number]) VITAMIN B1, WHOLE BLOOD:   Blood            Diagnosis Codes:   Z90.3 K91.2 E55.9 E66.9 Z68.35 R53.82       Bill Type:      Ins Code:           Responsible Party / Guarantor Information:   Name:     Address:     [place], [place] Zip:     Phone:     Relation to Pt:     Employer Name:           ABN:      Worker's Comp:   Date of Injury:              Insurance Information:   Primary Insurance: Secondary Insurance:   Ins Co Name: [id number]   Address 1: [facility] [address]   Address 2: [address] [place] [address]   Policy Number: [id number]   Group #: [id number]    Ins Co Name:     Address 1:     Address 2:     [place], [place] Zip:     Policy Number:     Group #:        Primary Policy Evnas / Insured: Secondary Policy Evans / Insured:   Name: David Becarols   Address: [address] [place] [id number]   Pt Relation to Subscriber: Self    Name:     Address:          Pt Relation to Subscriber:                  Electronically Signed By: Livan Magana[address]            David Beavers   1975 [id number]    David Beavers   1975 [id number]    David Beavers   1975 [id number]    David Beavers   1975 [id number]      David Beavers   1975 [id number]    David Beavers   1975 [id number]    David Beavers   1975 [id number]    David Beavers   1975 [id number]

## 2022-07-06 LAB
25(OH)D3 SERPL-MCNC: 48.2 NG/ML (ref 32–100)
A-G RATIO,AGRAT: 1.4 RATIO (ref 1.1–2.6)
ALBUMIN SERPL-MCNC: 4.4 G/DL (ref 3.5–5)
ALP SERPL-CCNC: 54 U/L (ref 25–115)
ALT SERPL-CCNC: 15 U/L (ref 5–40)
ANION GAP SERPL CALC-SCNC: 13 MMOL/L (ref 3–15)
AST SERPL W P-5'-P-CCNC: 20 U/L (ref 10–37)
BILIRUB SERPL-MCNC: 0.3 MG/DL (ref 0.2–1.2)
BUN SERPL-MCNC: 18 MG/DL (ref 6–22)
CALCIUM SERPL-MCNC: 9.8 MG/DL (ref 8.4–10.5)
CHLORIDE SERPL-SCNC: 102 MMOL/L (ref 98–110)
CO2 SERPL-SCNC: 26 MMOL/L (ref 20–32)
CREAT SERPL-MCNC: 0.7 MG/DL (ref 0.5–1.2)
ERYTHROCYTE [DISTWIDTH] IN BLOOD BY AUTOMATED COUNT: 13.7 % (ref 10–15.5)
FERRITIN SERPL-MCNC: 67 NG/ML (ref 10–291)
FOLATE,FOL: 18.4 NG/ML
GLOBULIN,GLOB: 3.2 G/DL (ref 2–4)
GLOMERULAR FILTRATION RATE: >60 ML/MIN/1.73 SQ.M.
GLUCOSE SERPL-MCNC: 94 MG/DL (ref 70–99)
HCT VFR BLD AUTO: 38.5 % (ref 35.1–48)
HGB BLD-MCNC: 12.1 G/DL (ref 11.7–16)
IRON,IRN: 79 MCG/DL (ref 30–160)
MCH RBC QN AUTO: 31 PG (ref 26–34)
MCHC RBC AUTO-ENTMCNC: 31 G/DL (ref 31–36)
MCV RBC AUTO: 98 FL (ref 80–99)
PLATELET # BLD AUTO: 254 K/UL (ref 140–440)
PMV BLD AUTO: 11.2 FL (ref 9–13)
POTASSIUM SERPL-SCNC: 4.3 MMOL/L (ref 3.5–5.5)
PROT SERPL-MCNC: 7.6 G/DL (ref 6.4–8.3)
RBC # BLD AUTO: 3.93 M/UL (ref 3.8–5.2)
SODIUM SERPL-SCNC: 141 MMOL/L (ref 133–145)
VIT B12 SERPL-MCNC: 637 PG/ML (ref 211–911)
VITAMIN B1, WHOLE BLOOD, 66250: 138.4 NMOL/L (ref 66.5–200)
WBC # BLD AUTO: 5.1 K/UL (ref 4–11)

## 2022-10-04 ENCOUNTER — OFFICE VISIT (OUTPATIENT)
Dept: SURGERY | Age: 47
End: 2022-10-04
Payer: MEDICAID

## 2022-10-04 VITALS
SYSTOLIC BLOOD PRESSURE: 117 MMHG | HEART RATE: 78 BPM | DIASTOLIC BLOOD PRESSURE: 76 MMHG | HEIGHT: 67 IN | OXYGEN SATURATION: 100 % | TEMPERATURE: 97.8 F | BODY MASS INDEX: 35.16 KG/M2 | WEIGHT: 224 LBS

## 2022-10-04 DIAGNOSIS — Z98.84 S/P LAPAROSCOPIC SLEEVE GASTRECTOMY: ICD-10-CM

## 2022-10-04 DIAGNOSIS — R11.2 NAUSEA AND VOMITING, UNSPECIFIED VOMITING TYPE: ICD-10-CM

## 2022-10-04 DIAGNOSIS — E66.9 OBESITY (BMI 30-39.9): ICD-10-CM

## 2022-10-04 DIAGNOSIS — K91.2 POSTGASTRECTOMY MALABSORPTION: Primary | ICD-10-CM

## 2022-10-04 DIAGNOSIS — Z71.3 ENCOUNTER FOR WEIGHT LOSS COUNSELING: ICD-10-CM

## 2022-10-04 DIAGNOSIS — Z90.3 POSTGASTRECTOMY MALABSORPTION: Primary | ICD-10-CM

## 2022-10-04 PROCEDURE — 99213 OFFICE O/P EST LOW 20 MIN: CPT | Performed by: NURSE PRACTITIONER

## 2022-10-04 RX ORDER — ONDANSETRON 4 MG/1
4 TABLET, ORALLY DISINTEGRATING ORAL
Qty: 30 TABLET | Refills: 1 | Status: SHIPPED | OUTPATIENT
Start: 2022-10-04

## 2022-10-04 NOTE — PROGRESS NOTES
Bariatric Postoperative Progress Note    CC: Weight Management    Petty Berry is a 51 y.o. female now 6 years status post laparoscopic sleeve gastrectomy performed on 7/26/16.  +5 lbs  Doing well overall. Currently eating chicken, beef, seafood, salad, tomatoes, blackberries, blueberries, bread, pasta, rice, potatoes, flan, pastries, soda, sparkling water . Taking in 60 oz water,  unknown g protein. 0 min of activity 7 days a week. Bowel movements are regular. She is compliant with multivitamins, calcium, Vit D and B12 supplements. Stopped Saxenda due to n/v, was unable to get to maintenance dose, but did feel that it helped suppress appetite and she did lose weight. Requesting something else to assist with weight management. Trying to get back on track with meal prepping. 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 Taking vitamins regularly with sip of orange juice right before bed to prevent nausea. She has taken on empty stomach and with meals before without improvement. This is the only thing that works for her.      Weight Loss Metrics 10/4/2022 10/4/2022 7/5/2022 7/5/2022 3/15/2022 3/15/2022 2/15/2022   Pre op / Initial Wt 259 - 259 - 259 - 259   Today's Wt - 224 lb - 219 lb - 225 lb -   BMI - 35.08 kg/m2 - 34.3 kg/m2 - 35.24 kg/m2 -   Ideal Body Wt 140 - 140 - 140 - 140   Excess Body Wt 119 - 119 - 119 - 119   Goal Wt 164 - 164 - 164 - 164   Wt loss to date 35 - 40 - 34 - 30   % Wt Loss 0.37 - 0.42 - 0.36 - 0.32   80% EBW 95.2 - 95.2 - 95.2 - 95.2         Past Medical History:   Diagnosis Date    Anemia     Diabetes (HCC)     Hypertension     Morbid obesity ([address]     Psychiatric disorder     Anxiety,panic attacks    Sleep apnea        Past Surgical History:   Procedure Laterality Date    HX GASTRECTOMY  7/ 2016    Vertical sleeve Gastrectomy    HX GYN pelvic laparoscopy       Current Outpatient Medications   Medication Sig Dispense Refill    ondansetron (ZOFRAN ODT) 4 mg disintegrating tablet Take 1 Tablet by mouth every eight (8) hours as needed for Nausea or Vomiting. 30 Tablet 1    clonazePAM (KlonoPIN) 0.5 mg tablet Take  by mouth nightly as needed for Anxiety. cholecalciferol (VITAMIN D3) (50,000 UNITS /1250 MCG) capsule Take 1 Capsule by mouth every seven (7) days. 8 Capsule 0    cyanocobalamin (VITAMIN B12) 100 mcg tablet Take 100 mcg by mouth daily. calcium citrate-vitamin d3 (CITRACAL+D) 315 mg-5 mcg (200 unit) tab Take 1 Tablet by mouth daily (with breakfast). multivitamin (ONE A DAY) tablet Take 1 Tablet by mouth daily. thiamine HCl (VITAMIN B-1 PO) Take  by mouth. ferrous sulfate (IRON PO) Take  by mouth. dextroamphetamine-amphetamine (ADDERALL) 30 mg tablet take 1 tablet by mouth twice a day  0    FLUoxetine (PROZAC) 20 mg capsule Take 20 mg by mouth daily. liraglutide, weight loss, (SAXENDA) 3 mg/0.5 mL (18 mg/3 mL) pen Week 1: 0.6 mg SC once daily x 7 days, Week 2: 1.2 mg SC once daily x 7 days, Week 3: 1.8 mg SC once daily x 7 days, Week 4: 2.4 mg SC once daily x 7 days, Week 5 and beyond: 3.0 mg SC once daily x 7 days (Patient not taking: Reported on 10/4/2022) 5 Adjustable Dose Pre-filled Pen Syringe 2    ergocalciferol (ERGOCALCIFEROL) 1,250 mcg (50,000 unit) capsule Take 1 Capsule by mouth every seven (7) days. (Patient not taking: Reported on 10/4/2022) 12 Capsule 1    PNV Comb #2-Iron-FA-Omega 3 29-1-400 mg cmpk Take  by mouth. (Patient not taking: No sig reported)      pen needle, diabetic (NovoFine Plus) 32 gauge x 1/6\" ndle 1 Dose by Does Not Apply route daily. (Patient not taking: Reported on 10/4/2022) 100 Pen Needle 1       No Known Allergies    ROS:  Review of Systems   Constitutional:  Positive for malaise/fatigue. Negative for weight loss. Respiratory: Negative. Cardiovascular: Negative. Gastrointestinal:  Positive for nausea and vomiting. Negative for abdominal pain, constipation, diarrhea and heartburn. Neurological: Negative. Psychiatric/Behavioral:  The patient has insomnia. Physicial Exam:  Visit Vitals  /76 (BP 1 Location: Left upper arm, BP Patient Position: Sitting)   Pulse 78   Temp 97.8 °F (36.6 °C) (Temporal)   Ht 5' 7\" (1.702 m)   Wt 101.6 kg (224 lb)   SpO2 100%   BMI 35.08 kg/m²     Physical Exam  Vitals and nursing note reviewed. Constitutional:       Appearance: She is obese. HENT:      Head: Normocephalic and atraumatic. Pulmonary:      Effort: Pulmonary effort is normal.   Musculoskeletal:         General: Normal range of motion. Neurological:      General: No focal deficit present. Mental Status: She is alert and oriented to person, place, and time. Psychiatric:         Mood and Affect: Mood normal.         Behavior: Behavior normal.       Labs:   No results found for this or any previous visit (from the past 672 hour(s)). Assessment/Plan:   -Discussed sleep hygiene to assist with insomnia. -Continue with recommended bariatric supplementation, labs were reviewed with pt-no significant abnormalities. Would not recommend taking with orange juice due to high carb content.   -Discussed other medications to assist with her chronic reoccurring obesity. Not a candidate for phentermine or Qsymia due to taking Adderall. She expressed some interest in Contrave but wants to hold on starting this medication for now. -After discussion of above, pt desires to try Saxenda again. Will stay at dose she is able to tolerate. Discussed ways to prevent nausea such as small meals throughout her shift, avoiding skipping meals, and will also prescribe Zofran to assist with nausea.      We have reviewed the components of a successful postoperative course including requirement for a high protein, low carbohydrate diet, 64 oz a day of zero calorie liquids, daily vitamin supplementation, daily exercise (150 mis/week), regular follow-up, and participation in support groups. Refer to registered dietitian for more detailed medical nutrition therapy as needed. 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 Diagnoses of S/P laparoscopic sleeve gastrectomy, Obesity (BMI 30-39.9), Encounter for weight loss counseling, and Nausea and vomiting, unspecified vomiting type were also pertinent to this visit. Follow-up and Dispositions    Return in about 6 weeks (around 11/15/2022). sooner as needed.   Fay Barrett, NP

## 2022-11-17 ENCOUNTER — OFFICE VISIT (OUTPATIENT)
Dept: SURGERY | Age: 47
End: 2022-11-17
Payer: MEDICAID

## 2022-11-17 VITALS
TEMPERATURE: 97 F | HEIGHT: 67 IN | HEART RATE: 63 BPM | WEIGHT: 219 LBS | OXYGEN SATURATION: 100 % | BODY MASS INDEX: 34.37 KG/M2 | DIASTOLIC BLOOD PRESSURE: 90 MMHG | SYSTOLIC BLOOD PRESSURE: 150 MMHG

## 2022-11-17 DIAGNOSIS — E66.9 OBESITY (BMI 30-39.9): Primary | ICD-10-CM

## 2022-11-17 DIAGNOSIS — Z71.3 ENCOUNTER FOR WEIGHT LOSS COUNSELING: ICD-10-CM

## 2022-11-17 DIAGNOSIS — R11.0 NAUSEA: ICD-10-CM

## 2022-11-17 DIAGNOSIS — Z79.899 FOLLOW-UP ENCOUNTER INVOLVING MEDICATION: ICD-10-CM

## 2022-11-17 DIAGNOSIS — E55.9 VITAMIN D INSUFFICIENCY: ICD-10-CM

## 2022-11-17 DIAGNOSIS — R53.82 CHRONIC FATIGUE: ICD-10-CM

## 2022-11-17 DIAGNOSIS — Z98.84 S/P LAPAROSCOPIC SLEEVE GASTRECTOMY: ICD-10-CM

## 2022-11-17 DIAGNOSIS — Z90.3 POSTGASTRECTOMY MALABSORPTION: ICD-10-CM

## 2022-11-17 DIAGNOSIS — K91.2 POSTGASTRECTOMY MALABSORPTION: ICD-10-CM

## 2022-11-17 PROCEDURE — 3074F SYST BP LT 130 MM HG: CPT | Performed by: NURSE PRACTITIONER

## 2022-11-17 PROCEDURE — 3078F DIAST BP <80 MM HG: CPT | Performed by: NURSE PRACTITIONER

## 2022-11-17 PROCEDURE — 99212 OFFICE O/P EST SF 10 MIN: CPT | Performed by: NURSE PRACTITIONER

## 2022-11-17 RX ORDER — ONDANSETRON 4 MG/1
4 TABLET, ORALLY DISINTEGRATING ORAL
Qty: 30 TABLET | Refills: 1 | Status: SHIPPED | OUTPATIENT
Start: 2022-11-17

## 2022-11-17 RX ORDER — ERGOCALCIFEROL 1.25 MG/1
50000 CAPSULE ORAL
Qty: 12 CAPSULE | Refills: 3 | Status: SHIPPED | OUTPATIENT
Start: 2022-11-17

## 2022-11-17 NOTE — PROGRESS NOTES
Bariatric Postoperative Progress Note    CC: Weight Management    Kallie Lynn is a 49 y.o. female now 6 years status post laparoscopic sleeve gastrectomy performed on 7/26/16.  -5 lbs  Doing well overall. Currently eating chicken, eggs, cheese, spinach, collards, cucumbers, blackberries, raspberries, occ donuts, bread, pasta, rice, occ soda. Taking in 50 oz water,  unknown g protein. 0 min of activity 7 days a week. Bowel movements are regular. She is compliant with multivitamins, calcium, Vit D and B12 supplements. Currently on 1.8 mg dose of Saxenda, reports nausea is better than when she was previously taking. Zofran is helping with nausea. Trying to focus on water intake and making better food choices. Labs completed with PCP office, requesting review of these. Weight Loss Metrics 11/17/2022 11/17/2022 10/4/2022 10/4/2022 7/5/2022 7/5/2022 3/15/2022   Pre op / Initial Wt 259 - 259 - 259 - 259   Today's Wt - 219 lb - 224 lb - 219 lb -   BMI - 34.3 kg/m2 - 35.08 kg/m2 - 34.3 kg/m2 -   Ideal Body Wt 140 - 140 - 140 - 140   Excess Body Wt 119 - 119 - 119 - 119   Goal Wt 164 - 164 - 164 - 164   Wt loss to date 40 - 35 - 40 - 34   % Wt Loss 0.42 - 0.37 - 0.42 - 0.36   80% EBW 95.2 - 95.2 - 95.2 - 95.2       Past Medical History:   Diagnosis Date    Anemia     Diabetes (HCC)     Hypertension     Morbid obesity (HCC)     Psychiatric disorder     Anxiety,panic attacks    Sleep apnea        Past Surgical History:   Procedure Laterality Date    HX GASTRECTOMY  7/ 2016    Vertical sleeve Gastrectomy    HX GYN      pelvic laparoscopy       Current Outpatient Medications   Medication Sig Dispense Refill    ondansetron (ZOFRAN ODT) 4 mg disintegrating tablet Take 1 Tablet by mouth every eight (8) hours as needed for Nausea or Vomiting. 30 Tablet 1    clonazePAM (KlonoPIN) 0.5 mg tablet Take  by mouth nightly as needed for Anxiety.       liraglutide, weight loss, (SAXENDA) 3 mg/0.5 mL (18 mg/3 mL) pen Week 1: 0.6 mg SC once daily x 7 days, Week 2: 1.2 mg SC once daily x 7 days, Week 3: 1.8 mg SC once daily x 7 days, Week 4: 2.4 mg SC once daily x 7 days, Week 5 and beyond: 3.0 mg SC once daily x 7 days 5 Adjustable Dose Pre-filled Pen Syringe 2    ergocalciferol (ERGOCALCIFEROL) 1,250 mcg (50,000 unit) capsule Take 1 Capsule by mouth every seven (7) days. 12 Capsule 1    pen needle, diabetic (NovoFine Plus) 32 gauge x 1/6\" ndle 1 Dose by Does Not Apply route daily. 100 Pen Needle 1    cyanocobalamin (VITAMIN B12) 100 mcg tablet Take 100 mcg by mouth daily. calcium citrate-vitamin d3 (CITRACAL+D) 315 mg-5 mcg (200 unit) tab Take 1 Tablet by mouth daily (with breakfast). multivitamin (ONE A DAY) tablet Take 1 Tablet by mouth daily. thiamine HCl (VITAMIN B-1 PO) Take  by mouth. ferrous sulfate (IRON PO) Take  by mouth. dextroamphetamine-amphetamine (ADDERALL) 30 mg tablet take 1 tablet by mouth twice a day  0    FLUoxetine (PROZAC) 20 mg capsule Take 20 mg by mouth daily. PNV Comb #2-Iron-FA-Omega 3 29-1-400 mg cmpk Take  by mouth. (Patient not taking: No sig reported)         No Known Allergies    ROS:  Review of Systems   Constitutional:  Positive for malaise/fatigue and weight loss. Respiratory: Negative. Cardiovascular:  Negative for chest pain and palpitations. Gastrointestinal:  Positive for nausea. Negative for abdominal pain, constipation, diarrhea, heartburn and vomiting. Neurological: Negative. Psychiatric/Behavioral:  The patient has insomnia. Physicial Exam:  Visit Vitals  BP (!) 150/90 (BP 1 Location: Left upper arm, BP Patient Position: Sitting)   Pulse 63   Temp 97 °F (36.1 °C) (Temporal)   Ht 5' 7\" (1.702 m)   Wt 99.3 kg (219 lb)   SpO2 100%   BMI 34.30 kg/m²     Physical Exam  Vitals and nursing note reviewed. Constitutional:       Appearance: She is obese. HENT:      Head: Normocephalic and atraumatic.    Pulmonary:      Effort: Pulmonary effort is normal.   Musculoskeletal:         General: Normal range of motion. Neurological:      General: No focal deficit present. Mental Status: She is alert and oriented to person, place, and time. Psychiatric:         Mood and Affect: Mood normal.         Behavior: Behavior normal.       Labs:   No results found for this or any previous visit (from the past 672 hour(s)). Assessment/Plan:   -Labs reviewed ordered by PCP, low-normal vitamin D, pt requesting high dose weekly D2 refill as she has not been taking in a while. Discussed hormone follow up with ordering provider.   -Continue with Saxenda 1.8 mg, increase dose as tolerated. Use Zofran prn for nausea. Focus on small high protein meals throughout the day to prevent nausea. We have reviewed the components of a successful postoperative course including requirement for a high protein, low carbohydrate diet, 64 oz a day of zero calorie liquids, daily vitamin supplementation, daily exercise (150 mis/week), regular follow-up, and participation in support groups. Refer to registered dietitian for more detailed medical nutrition therapy as needed. The primary encounter diagnosis was Obesity (BMI 30-39.9). Diagnoses of Postgastrectomy malabsorption, Chronic fatigue, S/P laparoscopic sleeve gastrectomy, Encounter for weight loss counseling, Nausea, Follow-up encounter involving medication, and Vitamin D insufficiency were also pertinent to this visit. Follow-up and Dispositions    Return in about 2 months (around 1/17/2023). sooner as needed.   Brenda John NP

## 2023-04-28 ENCOUNTER — TELEPHONE (OUTPATIENT)
Age: 48
End: 2023-04-28

## 2023-04-28 DIAGNOSIS — E66.9 CLASS 1 OBESITY WITHOUT SERIOUS COMORBIDITY WITH BODY MASS INDEX (BMI) OF 34.0 TO 34.9 IN ADULT, UNSPECIFIED OBESITY TYPE: Primary | ICD-10-CM

## 2023-06-01 NOTE — NURSE NAVIGATOR
Per MBSAQIP requirements:  Letter and email sent requesting follow up appointment. [facility] [address]      Dear Patient,    Your health is our main concern. It is important for your health to have follow-up lab work and to see your surgeon at 3 months, 6 months and annually after your weight loss surgery. Additionally, the Department of bariatric Surgery at our hospital is a member of the Metabolic and Bariatric Surgery Accreditation and Quality Improvement Program [facility]). As a participant in this program, we gather information on the outcomes of our patients after surgery. Please call the office for a follow up appointment at [phone] [facility]) or [phone] [facility]). If you have moved out of the area or have changed surgeons please call us and let us know the name of your doctor. Your health and feedback are important to us. We greatly appreciate your response.        Thank you,  [facility] [address] [facility]

## 2023-07-18 RX ORDER — LIRAGLUTIDE 6 MG/ML
INJECTION, SOLUTION SUBCUTANEOUS
Qty: 15 ML | OUTPATIENT
Start: 2023-07-18

## 2023-09-29 ENCOUNTER — TELEPHONE (OUTPATIENT)
Age: 48
End: 2023-09-29

## 2023-09-29 NOTE — TELEPHONE ENCOUNTER
Patient is requesting labs to be put in for upcoming appt. Pt is requesting to be notified once labs are put in so she can get them done.

## 2023-10-03 DIAGNOSIS — K91.2 POSTSURGICAL MALABSORPTION, NOT ELSEWHERE CLASSIFIED: Primary | ICD-10-CM

## 2023-10-03 DIAGNOSIS — E66.9 CLASS 1 OBESITY WITHOUT SERIOUS COMORBIDITY WITH BODY MASS INDEX (BMI) OF 34.0 TO 34.9 IN ADULT, UNSPECIFIED OBESITY TYPE: ICD-10-CM

## 2023-11-07 ENCOUNTER — OFFICE VISIT (OUTPATIENT)
Age: 48
End: 2023-11-07
Payer: MEDICAID

## 2023-11-07 VITALS
DIASTOLIC BLOOD PRESSURE: 101 MMHG | BODY MASS INDEX: 39.7 KG/M2 | HEIGHT: 66 IN | SYSTOLIC BLOOD PRESSURE: 140 MMHG | OXYGEN SATURATION: 98 % | HEART RATE: 70 BPM | TEMPERATURE: 97 F | WEIGHT: 247 LBS

## 2023-11-07 DIAGNOSIS — Z71.3 ENCOUNTER FOR WEIGHT LOSS COUNSELING: ICD-10-CM

## 2023-11-07 DIAGNOSIS — Z98.84 S/P LAPAROSCOPIC SLEEVE GASTRECTOMY: ICD-10-CM

## 2023-11-07 DIAGNOSIS — Z90.3 POSTGASTRECTOMY MALABSORPTION: Primary | ICD-10-CM

## 2023-11-07 DIAGNOSIS — E66.9 CLASS 2 OBESITY WITHOUT SERIOUS COMORBIDITY WITH BODY MASS INDEX (BMI) OF 39.0 TO 39.9 IN ADULT, UNSPECIFIED OBESITY TYPE: ICD-10-CM

## 2023-11-07 DIAGNOSIS — K91.2 POSTGASTRECTOMY MALABSORPTION: Primary | ICD-10-CM

## 2023-11-07 PROCEDURE — 99213 OFFICE O/P EST LOW 20 MIN: CPT | Performed by: NURSE PRACTITIONER

## 2023-11-07 PROCEDURE — 3078F DIAST BP <80 MM HG: CPT | Performed by: NURSE PRACTITIONER

## 2023-11-07 PROCEDURE — 3074F SYST BP LT 130 MM HG: CPT | Performed by: NURSE PRACTITIONER

## 2023-11-07 RX ORDER — TOPIRAMATE 25 MG/1
TABLET ORAL
Qty: 60 TABLET | Refills: 1 | Status: SHIPPED | OUTPATIENT
Start: 2023-11-07

## 2023-11-07 NOTE — PROGRESS NOTES
Bariatric Postoperative Note    CC: Annual Bariatric Follow Up/Weight Management    Bj Owen is a 51 y.o. female now 7 years status post laparoscopic sleeve gastrectomy performed on 7/26/16. Diet consists of oatmeal, salad, fast food. Taking in 40-60 oz sugar free fluids,  unknown g protein. 30 min of activity 4 days a week. Bowel movements are regular. She is compliant with recommended bariatric vitamin supplementation. +28 lbs since visit last year  Stopped Saxenda due to nausea. Reports increase in anxiety, which has impacted snacking. 11/7/2023     1:41 PM 9/28/2023     5:14 PM 11/17/2022    11:06 AM 10/4/2022     9:54 AM 7/5/2022     9:36 AM 3/15/2022     3:09 PM 2/15/2022     1:26 PM   Weight Loss Metrics   Pre-op weight (manual entry) 259 lbs         Height 5' 6\" 5' 6\" 5' 7\" 5' 7\" 5' 7\" 5' 7\" 5' 7\"   Weight - Scale 247 lbs 240 lbs 219 lbs 224 lbs 219 lbs 225 lbs 229 lbs   BMI (Calculated) 40 kg/m2 38.8 kg/m2 34.4 kg/m2 35.2 kg/m2 34.4 kg/m2 35.3 kg/m2 35.9 kg/m2   Ideal body weight (manual entry) 140 lbs         EBW in lbs. 119         Weight loss to date in lbs.  12         Percent weight loss (%) 4.63 %         Percent EBW loss (%) 10.1 %             Comorbidities:  Hypertension: improved  Diabetes: resolved  Obstructive Sleep Apnea: resolved  Hyperlipidemia: not applicable  Stress Urinary Incontinence: improved  Gastroesophageal Reflux: not applicable  Weight related arthropathy:not applicable      Past Medical History:   Diagnosis Date    Anemia     Diabetes ([address])     Hypertension     Morbid obesity ([address])     Psychiatric disorder     Anxiety,panic attacks    Sleep apnea        Past Surgical History:   Procedure Laterality Date    GASTRECTOMY  7/ 2016    Vertical sleeve Gastrectomy    GYN      pelvic laparoscopy       Current Outpatient Medications   Medication Sig Dispense Refill    topiramate (TOPAMAX) 25 MG tablet Take one tab PO daily, starting week two can increase to two

## 2023-11-08 ENCOUNTER — CLINICAL DOCUMENTATION (OUTPATIENT)
Facility: HOSPITAL | Age: 48
End: 2023-11-08

## 2023-11-08 NOTE — PROGRESS NOTES
RD called called and left a voicemail message. RD also sent an e-mail for patient to see RD for Back on Track help.   Chris Lam RD

## 2023-11-14 ASSESSMENT — ENCOUNTER SYMPTOMS
SHORTNESS OF BREATH: 0
EYES NEGATIVE: 1
VOMITING: 0
NAUSEA: 0
ABDOMINAL PAIN: 0
BLOOD IN STOOL: 0
DIARRHEA: 0
CONSTIPATION: 0
WHEEZING: 0
COUGH: 0
ABDOMINAL DISTENTION: 0

## 2023-11-30 RX ORDER — ERGOCALCIFEROL 1.25 MG/1
50000 CAPSULE ORAL
Qty: 12 CAPSULE | Refills: 1 | Status: SHIPPED | OUTPATIENT
Start: 2023-11-30

## 2024-09-23 ENCOUNTER — TELEPHONE (OUTPATIENT)
Age: 49
End: 2024-09-23

## 2024-09-23 DIAGNOSIS — Z90.3 POSTGASTRECTOMY MALABSORPTION: Primary | ICD-10-CM

## 2024-09-23 DIAGNOSIS — K91.2 POSTGASTRECTOMY MALABSORPTION: Primary | ICD-10-CM

## 2024-09-23 DIAGNOSIS — E11.9 TYPE 2 DIABETES MELLITUS WITHOUT COMPLICATION, UNSPECIFIED WHETHER LONG TERM INSULIN USE (HCC): ICD-10-CM

## 2024-11-05 ENCOUNTER — TELEPHONE (OUTPATIENT)
Age: 49
End: 2024-11-05

## 2024-11-06 ENCOUNTER — TELEPHONE (OUTPATIENT)
Age: 49
End: 2024-11-06

## 2024-11-06 NOTE — TELEPHONE ENCOUNTER
I spoke with the pt to reschedule her no show appt but she would like to speak with a nurse about her lab work before rescheduling. Pt said Reba has access to her lab results from her PCP and doesn't understand why she needs to get additional lab work done. Pt said if she has to get lab work done she'll get it done across the street at [facility] after her appt and after Reba tells her she needs it. I informed her lab work is done before the appt and that [facility] has been closed. I also informed her she can go to , [facility], or any place she chooses and we will fax the orders. Pt said she did not understand what I was talking about and she doesn't know where to go. She doesn't drive outside of [place] and will not drive to any place she doesn't know because she could get lost.

## 2024-11-13 ENCOUNTER — TELEPHONE (OUTPATIENT)
Age: 49
End: 2024-11-13

## 2024-11-13 NOTE — TELEPHONE ENCOUNTER
Spoke with patient she will do labs with her pcp then call to reschedule her appt that she missed.

## 2024-11-19 ENCOUNTER — TELEPHONE (OUTPATIENT)
Age: 49
End: 2024-11-19

## 2024-12-17 RX ORDER — ERGOCALCIFEROL 1.25 MG/1
50000 CAPSULE, LIQUID FILLED ORAL
Qty: 12 CAPSULE | Refills: 1 | OUTPATIENT
Start: 2024-12-17

## 2025-01-30 ENCOUNTER — OFFICE VISIT (OUTPATIENT)
Age: 50
End: 2025-01-30
Payer: MEDICAID

## 2025-01-30 VITALS
DIASTOLIC BLOOD PRESSURE: 96 MMHG | OXYGEN SATURATION: 99 % | HEART RATE: 69 BPM | HEIGHT: 66 IN | WEIGHT: 254 LBS | BODY MASS INDEX: 40.82 KG/M2 | SYSTOLIC BLOOD PRESSURE: 140 MMHG

## 2025-01-30 DIAGNOSIS — E66.01 MORBID OBESITY: Primary | ICD-10-CM

## 2025-01-30 DIAGNOSIS — K91.2 POSTGASTRECTOMY MALABSORPTION: ICD-10-CM

## 2025-01-30 DIAGNOSIS — Z90.3 POSTGASTRECTOMY MALABSORPTION: ICD-10-CM

## 2025-01-30 DIAGNOSIS — Z71.3 ENCOUNTER FOR WEIGHT LOSS COUNSELING: ICD-10-CM

## 2025-01-30 DIAGNOSIS — Z98.84 S/P LAPAROSCOPIC SLEEVE GASTRECTOMY: ICD-10-CM

## 2025-01-30 PROCEDURE — 3077F SYST BP >= 140 MM HG: CPT | Performed by: NURSE PRACTITIONER

## 2025-01-30 PROCEDURE — 3080F DIAST BP >= 90 MM HG: CPT | Performed by: NURSE PRACTITIONER

## 2025-01-30 PROCEDURE — 99214 OFFICE O/P EST MOD 30 MIN: CPT | Performed by: NURSE PRACTITIONER

## 2025-01-30 RX ORDER — MULTIVIT-MIN/IRON/FOLIC ACID/K 45-800-120
1 CAPSULE ORAL DAILY
COMMUNITY

## 2025-01-30 RX ORDER — SEMAGLUTIDE 0.25 MG/.5ML
0.25 INJECTION, SOLUTION SUBCUTANEOUS
Qty: 2 ML | Refills: 0 | Status: SHIPPED | OUTPATIENT
Start: 2025-01-30

## 2025-01-30 RX ORDER — LOSARTAN POTASSIUM 50 MG/1
50 TABLET ORAL DAILY
COMMUNITY
Start: 2024-11-22

## 2025-01-30 RX ORDER — SEMAGLUTIDE 0.5 MG/.5ML
0.5 INJECTION, SOLUTION SUBCUTANEOUS
Qty: 2 ML | Refills: 0 | Status: SHIPPED | OUTPATIENT
Start: 2025-01-30

## 2025-01-30 NOTE — PROGRESS NOTES
Bariatric Postoperative Nurse Note      Juliet Christina Dennis is a 49 y.o. female status post laparoscopic sleeve gastrectomy performed on 07/26/2016.    All Post-Ops (including two weeks)  -# of grams of protein daily? 60 g  -sources of protein? Protein shakes, Chicken, Beef  -# of oz of sugar free fluids from all sources daily? 100-120 oz   -Nausea? No  -Vomiting? No  -Difficulty swallow/food sticking? No  -Heartburn/regurgitation? No  -Character of bowel movements (diarrhea/constipation/bloody stools?) alternating between constipation and regularity  -Which multivitamin product are you taking? Bariatric Advantage   -What dose and how frequently are you taking calcium citrate? 600mg twice daily  - from any iron-containing multivitamin by 2 hours? Yes  -Ulcer risk exposures:   NSAID No  Tobacco No  Alcohol No  Steroids No  -Minutes of physical activity and what type? Not currently.

## 2025-01-30 NOTE — PROGRESS NOTES
Bariatric Postoperative Progress Note    Chief Complaint   Patient presents with    Follow-up     Gastric Sleeve (07/26/2016)       History of Present Illness  The patient is a 49-year-old female presenting for annual bariatric surgery follow-up. She is status post laparoscopic sleeve gastrectomy performed on 07/26/2016.    She has been experiencing elevated blood pressure and cholesterol levels, which she attributes to her weight. She has a history of emotional eating and has recently implemented a controlled diet, consuming one meal per day and abstaining from food between a certain window. This regimen has been in place for a month, but she is uncertain about its impact on her weight. She has abstained from soda for over a month and maintains hydration by drinking approximately a gallon of water daily. She has successfully lost weight multiple times in the past but struggles to maintain the weight loss. She is considering resuming medication to assist with her weight loss efforts. She has previously tried Saxenda but discontinued it due to side effects. She has also tried topiramate but discontinued it due to fear of seizure recurrence.     She is currently experiencing menopause and reports hair loss and dry skin. She has been applying moisturizers to alleviate skin tightness.    See nurse note for additional subjective information.         1/30/2025     2:21 PM 11/7/2023     1:41 PM 9/28/2023     5:14 PM 11/17/2022    11:06 AM 10/4/2022     9:54 AM 7/5/2022     9:36 AM 3/15/2022     3:09 PM   Weight Loss Metrics   Pre-op weight (manual entry) 259 lbs 259 lbs        Height 5' 6\" 5' 6\" 5' 6\" 5' 7\" 5' 7\" 5' 7\" 5' 7\"   Weight - Scale 254 lbs 247 lbs 240 lbs 219 lbs 224 lbs 219 lbs 225 lbs   BMI (Calculated) 41.1 kg/m2 40 kg/m2 38.8 kg/m2 34.4 kg/m2 35.2 kg/m2 34.4 kg/m2 35.3 kg/m2   Ideal body weight (manual entry) 140 lbs 140 lbs        EBW in lbs. 119 119        Weight loss to date in lbs. 5 12        Percent

## 2025-02-03 ASSESSMENT — ENCOUNTER SYMPTOMS
WHEEZING: 0
VOMITING: 0
BLOOD IN STOOL: 0
EYES NEGATIVE: 1
SHORTNESS OF BREATH: 0
ABDOMINAL DISTENTION: 0
COUGH: 0
NAUSEA: 0
ABDOMINAL PAIN: 0
DIARRHEA: 0
CONSTIPATION: 0

## 2025-02-05 ENCOUNTER — CLINICAL DOCUMENTATION (OUTPATIENT)
Facility: HOSPITAL | Age: 50
End: 2025-02-05

## 2025-02-06 ENCOUNTER — TELEPHONE (OUTPATIENT)
Age: 50
End: 2025-02-06

## 2025-02-06 NOTE — TELEPHONE ENCOUNTER
Lvm letting patient know I received her email and PA has been submitted and once we receive outcome she will be contacted

## 2025-02-10 ENCOUNTER — TELEPHONE (OUTPATIENT)
Age: 50
End: 2025-02-10

## 2025-02-24 ENCOUNTER — TELEPHONE (OUTPATIENT)
Age: 50
End: 2025-02-24

## 2025-02-24 NOTE — TELEPHONE ENCOUNTER
Patient called with questions regarding her Wegovy. She states she just took the second injection of the 0.25 mg today. She states that she took her first and second injection in her thigh. She reports not feeling any side effects or appetite suppression. She reports still feeling hungry and binging food. She was concerned if this is normal or if perhaps the dose is too low for her and she needs to increase her dose? I spoke with Reba and confirmed for her to inject her next 0.25 mg pen into her abdomen and see if she notices a difference then. OK with Reba NP to double up on 0.25 mg pens but will run the risk of not being able to  the 0.5 mg dose from the pharmacy because it will be too early and she may go a week without any medication. Patient is aware, I also said that even if she did double up on the 0.25 mg dose and go a week without it while waiting to  the 0.5 mg, she may not have severe side effects starting 0.5 mg after not having it for a week. Patient states she will try injection just one 0.25mg pen into her abdomen next week and go from there. Patient asked if she could push her follow up appointment out further until she gets to a higher dose, OK with Reba DELACRUZ. Patient states she will call the office and have them reschedule her appointment date further.

## 2025-03-18 ENCOUNTER — CLINICAL DOCUMENTATION (OUTPATIENT)
Facility: HOSPITAL | Age: 50
End: 2025-03-18

## 2025-03-18 ENCOUNTER — HOSPITAL ENCOUNTER (OUTPATIENT)
Facility: HOSPITAL | Age: 50
Discharge: HOME | End: 2025-03-21

## 2025-03-18 NOTE — PROGRESS NOTES
RD reached out to patient to set SMART goals.    Future SMART goals are:    Breakfast within an hour.  Water > 64 ounces a day.    We are set to talk in the future.  Lata Downs RD

## 2025-03-19 ENCOUNTER — HOSPITAL ENCOUNTER (OUTPATIENT)
Facility: HOSPITAL | Age: 50
Discharge: HOME | End: 2025-03-22

## 2025-03-19 ENCOUNTER — CLINICAL DOCUMENTATION (OUTPATIENT)
Facility: HOSPITAL | Age: 50
End: 2025-03-19

## 2025-03-19 NOTE — PROGRESS NOTES
[facility]  [address], [address]      Patient's Name: Juliet Dennis     Age: 49 y.o.  YOB: 1975     Sex: female    Date:   3/19/2025    Height: 5' 6\"   Weight:    259      Lbs.        Surgery Date: 2016 of August    Surgeon: Dr. Artis    Starting Weight: 307          Procedure: Sleeve    Lowest Weight patient has achieved since surgery: 159.  For about 6 months.   Goal weight 149#.  Eating less that 25 grams of carbs a day.     Then entered arb back into life.      Are you currently taking anti-obesity medications?  Wegovy.    If yes, how long have you been on this for?  5th shot.       Diet History (reported by patient on diet history form)    Do you smoke: no    Alcohol Intake: none    Diet History:    Wake up at 8:30 am at work (from home) by 9:00 am).   10:30 am [employer] Egg McMuffin and large sweet tea.    Keep eating the egg McMuffin.    4:49 Mexican, 10:00 pm.  Done eating.        Does patient tend to eat a portion, get full, and stop:  yes    Patient is taking 20 minutes to eat a meal.    Patient is stopping fluid 30 minutes before and after a meal and no fluid with her meal.    Intake of Eating out:  Everyday.  [employer], eat at [employer] shares with her son who is also obese.      Foods being consumed when eating out: not healthy food and drink.    Simple sugar intake: yes, too much    Carbohydrate intake: too many    Ounces of fluid consumed per day: 22-30 ounces of water a day.  15 ounces ice sweet tea. 2 cans a day of soda.  (Not drinking it now). Coffee with protein shake.  12 ounces of coffee and protein shake.    Fluids being consumed: sweet tea, water, protein shake.      Patient is drinking protein drinks      Exercise History    Patient is doing nothing for exercise.  Just walking and cleaning house.    Sleep    Patient is getting 5 hours of sleep per night.    Vitamins    Patient is taking

## 2025-03-27 ENCOUNTER — TELEPHONE (OUTPATIENT)
Age: 50
End: 2025-03-27

## 2025-03-27 RX ORDER — ONDANSETRON 4 MG/1
4 TABLET, FILM COATED ORAL EVERY 8 HOURS PRN
Qty: 30 TABLET | Refills: 1 | Status: SHIPPED | OUTPATIENT
Start: 2025-03-27

## 2025-03-27 NOTE — TELEPHONE ENCOUNTER
Called pt complaints of nausea day 3 after dose of wegovy 5 milligrams encouraged fluids and protein,requesting for rx for zofran, message to Reba Kincaid

## 2025-05-22 DIAGNOSIS — E66.01 MORBID OBESITY (HCC): ICD-10-CM

## 2025-05-22 NOTE — TELEPHONE ENCOUNTER
Pharmacist from [facility] requesting refill for medications for patient.  States patient is trying to transfer her rx's from [facility] to [facility] but there no refills attached to medications.  I notified pharmacist request will be sent to provider. Pharmacist verbalized understanding.    Patient last seen 01/30/25  Upcoming appt 05/27/25

## 2025-05-23 RX ORDER — SEMAGLUTIDE 0.5 MG/.5ML
0.5 INJECTION, SOLUTION SUBCUTANEOUS
Qty: 2 ML | Refills: 0 | OUTPATIENT
Start: 2025-05-23

## 2025-05-23 RX ORDER — ERGOCALCIFEROL 1.25 MG/1
50000 CAPSULE, LIQUID FILLED ORAL
Qty: 12 CAPSULE | Refills: 1 | Status: SHIPPED | OUTPATIENT
Start: 2025-05-23

## 2025-05-27 ENCOUNTER — OFFICE VISIT (OUTPATIENT)
Age: 50
End: 2025-05-27
Payer: MEDICAID

## 2025-05-27 DIAGNOSIS — Z79.899 FOLLOW-UP ENCOUNTER INVOLVING MEDICATION: ICD-10-CM

## 2025-05-27 DIAGNOSIS — E66.01 MORBID OBESITY (HCC): Primary | ICD-10-CM

## 2025-05-27 DIAGNOSIS — Z98.84 S/P LAPAROSCOPIC SLEEVE GASTRECTOMY: ICD-10-CM

## 2025-05-27 DIAGNOSIS — R11.0 NAUSEA: ICD-10-CM

## 2025-05-27 DIAGNOSIS — Z71.3 ENCOUNTER FOR WEIGHT LOSS COUNSELING: ICD-10-CM

## 2025-05-27 PROCEDURE — 99214 OFFICE O/P EST MOD 30 MIN: CPT | Performed by: NURSE PRACTITIONER

## 2025-05-27 RX ORDER — ONDANSETRON 4 MG/1
4 TABLET, FILM COATED ORAL EVERY 8 HOURS PRN
Qty: 30 TABLET | Refills: 1 | Status: SHIPPED | OUTPATIENT
Start: 2025-05-27

## 2025-05-27 RX ORDER — SEMAGLUTIDE 1 MG/.5ML
1 INJECTION, SOLUTION SUBCUTANEOUS
Qty: 2 ML | Refills: 0 | Status: SHIPPED | OUTPATIENT
Start: 2025-05-27

## 2025-05-27 RX ORDER — SEMAGLUTIDE 0.5 MG/.5ML
0.5 INJECTION, SOLUTION SUBCUTANEOUS
Qty: 2 ML | Refills: 0 | Status: SHIPPED | OUTPATIENT
Start: 2025-05-27

## 2025-05-27 NOTE — PROGRESS NOTES
Juliet Vasquez Jeannie, was evaluated through a synchronous (real-time) audio-video encounter. The patient (or guardian if applicable) is aware that this is a billable service, which includes applicable co-pays. This Virtual Visit was conducted with patient's (and/or legal guardian's) consent. Patient identification was verified, and a caregiver was present when appropriate.   The patient was located at Home: [address] [place] [address] [address]  [place] [address]  Provider was located at Facility (Appt Dept): [address] [facility] [address]  [place] [address]  Confirm you are appropriately licensed, registered, or certified to deliver care in the state where the patient is located as indicated above. If you are not or unsure, please re-schedule the visit: Yes, I confirm.      Total time spent for this encounter: Not billed by time    --ROMELIA Flores - NP on 6/2/2025 at 12:59 PM    An electronic signature was used to authenticate this note.       Bariatric Postoperative Progress Note    Chief Complaint   Patient presents with    Follow-up     Gastric sleeve, 7/26/2016       History of Present Illness  The patient is a 49-year-old female presenting via virtual visit for weight management. She is status post laparoscopic sleeve gastrectomy performed on 07/26/2016 and is currently on Wegovy 0.5 mg.    She initiated Wegovy therapy on 02/17/2025, starting with a 0.25 mg dose for one month, followed by an increase to 0.5 mg from mid-March through mid-April. Nausea was experienced upon increasing the dose, but no appetite suppression was perceived, which contrasts with her previous medication. Despite this, she expresses a desire to continue with Wegovy. Due to the impending closure of [facility], she has been unable to obtain the medication and has been off it for approximately one month. Adequate protein and fluid intake have been maintained, and she is compliant with her vitamin regimen. An upcoming appointment with the

## 2025-05-27 NOTE — PROGRESS NOTES
Bariatric Postoperative Nurse Note      Juliet Jenixavier Jeannie is a 49 y.o. female status post  gastric sleeve  performed on 7/26/2016.    All Post-Ops (including two weeks)  -# of grams of protein daily? 90 grams  -sources of protein? Protein shakes, tuna, chicken, beef, pork.  -# of oz of sugar free fluids from all sources daily?  50+ oz  -Nausea? Yes, only after wegovy  -Vomiting? No  -Difficulty swallow/food sticking? No  -Heartburn/regurgitation? No  -Character of bowel movements (diarrhea/constipation/bloody stools?) regular  -Which multivitamin product are you taking?  Bariatric     -What dose and how frequently are you taking calcium citrate? 3-4 times weekly  - from any iron-containing multivitamin by 2 hours? Yes  -Ulcer risk exposures:   NSAID No  Tobacco No  Alcohol No  Steroids No  -Minutes of physical activity and what type? House chores and innovating

## 2025-06-02 ASSESSMENT — ENCOUNTER SYMPTOMS
DIARRHEA: 0
ABDOMINAL PAIN: 0
SHORTNESS OF BREATH: 0
CONSTIPATION: 0
NAUSEA: 1
COUGH: 0
VOMITING: 0

## 2025-06-25 ENCOUNTER — CLINICAL DOCUMENTATION (OUTPATIENT)
Facility: HOSPITAL | Age: 50
End: 2025-06-25

## 2025-08-06 ENCOUNTER — OFFICE VISIT (OUTPATIENT)
Age: 50
End: 2025-08-06
Payer: MEDICAID

## 2025-08-06 VITALS
HEIGHT: 66 IN | WEIGHT: 250 LBS | BODY MASS INDEX: 40.18 KG/M2 | HEART RATE: 75 BPM | DIASTOLIC BLOOD PRESSURE: 91 MMHG | SYSTOLIC BLOOD PRESSURE: 145 MMHG

## 2025-08-06 DIAGNOSIS — Z79.899 FOLLOW-UP ENCOUNTER INVOLVING MEDICATION: ICD-10-CM

## 2025-08-06 DIAGNOSIS — E66.813 CLASS 3 SEVERE OBESITY WITH SERIOUS COMORBIDITY AND BODY MASS INDEX (BMI) OF 40.0 TO 44.9 IN ADULT, UNSPECIFIED OBESITY TYPE (HCC): Primary | ICD-10-CM

## 2025-08-06 DIAGNOSIS — Z98.84 S/P LAPAROSCOPIC SLEEVE GASTRECTOMY: ICD-10-CM

## 2025-08-06 DIAGNOSIS — Z71.3 ENCOUNTER FOR WEIGHT LOSS COUNSELING: ICD-10-CM

## 2025-08-06 PROCEDURE — 3080F DIAST BP >= 90 MM HG: CPT | Performed by: NURSE PRACTITIONER

## 2025-08-06 PROCEDURE — 99213 OFFICE O/P EST LOW 20 MIN: CPT | Performed by: NURSE PRACTITIONER

## 2025-08-06 PROCEDURE — 3077F SYST BP >= 140 MM HG: CPT | Performed by: NURSE PRACTITIONER

## 2025-08-06 RX ORDER — SEMAGLUTIDE 1.7 MG/.75ML
1.7 INJECTION, SOLUTION SUBCUTANEOUS
Qty: 3 ML | Refills: 0 | Status: SHIPPED | OUTPATIENT
Start: 2025-08-06

## 2025-08-06 RX ORDER — SEMAGLUTIDE 2.4 MG/.75ML
2.4 INJECTION, SOLUTION SUBCUTANEOUS
Qty: 3 ML | Refills: 2 | Status: SHIPPED | OUTPATIENT
Start: 2025-08-06

## 2025-08-07 ASSESSMENT — ENCOUNTER SYMPTOMS
DIARRHEA: 0
COUGH: 0
SHORTNESS OF BREATH: 0
ABDOMINAL PAIN: 0
CONSTIPATION: 0
NAUSEA: 0
VOMITING: 0

## 2025-08-12 ENCOUNTER — TELEPHONE (OUTPATIENT)
Age: 50
End: 2025-08-12

## 2025-08-19 DIAGNOSIS — E66.01 MORBID OBESITY (HCC): ICD-10-CM

## 2025-08-19 RX ORDER — SEMAGLUTIDE 1 MG/.5ML
INJECTION, SOLUTION SUBCUTANEOUS
Qty: 4 ML | Refills: 0 | Status: SHIPPED | OUTPATIENT
Start: 2025-08-19